# Patient Record
Sex: MALE | Race: WHITE | NOT HISPANIC OR LATINO | Employment: OTHER | URBAN - METROPOLITAN AREA
[De-identification: names, ages, dates, MRNs, and addresses within clinical notes are randomized per-mention and may not be internally consistent; named-entity substitution may affect disease eponyms.]

---

## 2023-01-03 LAB
CHOLEST SERPL-MSCNC: 227 MG/DL (ref 0–200)
HDLC SERPL-MCNC: 32 MG/DL (ref 35–70)
LDLC SERPL CALC-MCNC: 152 MG/DL (ref 0–160)
TRIGL SERPL-MCNC: 217 MG/DL (ref 40–160)

## 2023-07-10 DIAGNOSIS — G89.29 CHRONIC NECK PAIN: Primary | ICD-10-CM

## 2023-07-10 DIAGNOSIS — G89.29 CHRONIC GENERALIZED PAIN: ICD-10-CM

## 2023-07-10 DIAGNOSIS — M54.2 CHRONIC NECK PAIN: Primary | ICD-10-CM

## 2023-07-10 DIAGNOSIS — R52 CHRONIC GENERALIZED PAIN: ICD-10-CM

## 2023-07-10 RX ORDER — MELOXICAM 15 MG/1
15 TABLET ORAL DAILY
Qty: 30 TABLET | Refills: 1 | Status: SHIPPED | OUTPATIENT
Start: 2023-07-10 | End: 2023-09-14 | Stop reason: DRUGHIGH

## 2023-07-10 RX ORDER — MELOXICAM 15 MG/1
15 TABLET ORAL DAILY
COMMUNITY
End: 2023-07-10 | Stop reason: SDUPTHER

## 2023-07-10 NOTE — TELEPHONE ENCOUNTER
Patient requesting refill  Last Rx 05/09/2023    # 30   X 0    Last ov 05/09/2023    Next  07/24/2023   Lab

## 2023-07-27 DIAGNOSIS — F41.9 ANXIETY: Primary | ICD-10-CM

## 2023-07-27 RX ORDER — ALPRAZOLAM 0.5 MG/1
0.5 TABLET ORAL DAILY PRN
COMMUNITY
Start: 2023-07-07 | End: 2023-07-27 | Stop reason: SDUPTHER

## 2023-08-10 RX ORDER — ALPRAZOLAM 0.5 MG/1
0.5 TABLET ORAL DAILY PRN
Qty: 30 TABLET | Refills: 0 | Status: SHIPPED | OUTPATIENT
Start: 2023-08-10 | End: 2023-09-14 | Stop reason: SDUPTHER

## 2023-08-28 ENCOUNTER — TELEPHONE (OUTPATIENT)
Dept: ADMINISTRATIVE | Facility: HOSPITAL | Age: 48
End: 2023-08-28
Payer: MEDICARE

## 2023-08-28 RX ORDER — ACETAMINOPHEN 500 MG
5000 TABLET ORAL DAILY
COMMUNITY
End: 2023-09-14

## 2023-08-28 RX ORDER — CETIRIZINE HYDROCHLORIDE 10 MG/1
10 TABLET ORAL DAILY
COMMUNITY
End: 2024-03-21

## 2023-08-28 RX ORDER — FLUTICASONE PROPIONATE 50 MCG
1 SPRAY, SUSPENSION (ML) NASAL DAILY
COMMUNITY
End: 2024-03-21

## 2023-08-28 RX ORDER — ADALIMUMAB 40MG/0.4ML
40 KIT SUBCUTANEOUS WEEKLY
COMMUNITY

## 2023-09-08 PROBLEM — L73.2 HIDRADENITIS SUPPURATIVA: Status: ACTIVE | Noted: 2023-09-08

## 2023-09-08 PROBLEM — J30.9 NASAL SINUS INFLAMMATION DUE TO ALLERGY: Status: ACTIVE | Noted: 2023-09-08

## 2023-09-08 PROBLEM — E66.09 OBESITY DUE TO EXCESS CALORIES WITHOUT SERIOUS COMORBIDITY: Status: ACTIVE | Noted: 2023-09-08

## 2023-09-08 PROBLEM — F41.9 ANXIETY: Status: ACTIVE | Noted: 2023-09-08

## 2023-09-14 ENCOUNTER — OFFICE VISIT (OUTPATIENT)
Dept: FAMILY MEDICINE | Facility: CLINIC | Age: 48
End: 2023-09-14
Payer: MEDICARE

## 2023-09-14 VITALS
RESPIRATION RATE: 20 BRPM | SYSTOLIC BLOOD PRESSURE: 122 MMHG | DIASTOLIC BLOOD PRESSURE: 80 MMHG | TEMPERATURE: 97 F | OXYGEN SATURATION: 96 % | HEART RATE: 80 BPM | HEIGHT: 68 IN | WEIGHT: 312.63 LBS | BODY MASS INDEX: 47.38 KG/M2

## 2023-09-14 DIAGNOSIS — M25.50 PAIN IN JOINT, MULTIPLE SITES: ICD-10-CM

## 2023-09-14 DIAGNOSIS — M54.2 NECK PAIN: ICD-10-CM

## 2023-09-14 DIAGNOSIS — G47.33 OSA (OBSTRUCTIVE SLEEP APNEA): ICD-10-CM

## 2023-09-14 DIAGNOSIS — F41.1 GENERALIZED ANXIETY DISORDER: Primary | ICD-10-CM

## 2023-09-14 DIAGNOSIS — E55.9 VITAMIN D DEFICIENCY: ICD-10-CM

## 2023-09-14 DIAGNOSIS — Z79.899 ENCOUNTER FOR LONG-TERM (CURRENT) USE OF MEDICATIONS: ICD-10-CM

## 2023-09-14 DIAGNOSIS — E66.01 CLASS 3 SEVERE OBESITY DUE TO EXCESS CALORIES WITH SERIOUS COMORBIDITY AND BODY MASS INDEX (BMI) OF 45.0 TO 49.9 IN ADULT: ICD-10-CM

## 2023-09-14 DIAGNOSIS — J30.9 NASAL SINUS INFLAMMATION DUE TO ALLERGY: ICD-10-CM

## 2023-09-14 PROBLEM — E78.2 MIXED HYPERLIPIDEMIA: Status: RESOLVED | Noted: 2023-09-14 | Resolved: 2023-09-14

## 2023-09-14 PROBLEM — K21.9 GERD (GASTROESOPHAGEAL REFLUX DISEASE): Status: ACTIVE | Noted: 2023-09-14

## 2023-09-14 PROBLEM — E78.2 MIXED HYPERLIPIDEMIA: Status: ACTIVE | Noted: 2023-09-14

## 2023-09-14 PROBLEM — I10 HTN (HYPERTENSION): Status: ACTIVE | Noted: 2023-09-14

## 2023-09-14 PROBLEM — I10 HTN (HYPERTENSION): Status: RESOLVED | Noted: 2023-09-14 | Resolved: 2023-09-14

## 2023-09-14 PROBLEM — E66.9 OBESE: Status: ACTIVE | Noted: 2023-09-08

## 2023-09-14 PROCEDURE — 99214 PR OFFICE/OUTPT VISIT, EST, LEVL IV, 30-39 MIN: ICD-10-PCS | Mod: ,,, | Performed by: FAMILY MEDICINE

## 2023-09-14 PROCEDURE — 99214 OFFICE O/P EST MOD 30 MIN: CPT | Mod: ,,, | Performed by: FAMILY MEDICINE

## 2023-09-14 RX ORDER — ERGOCALCIFEROL 1.25 MG/1
50000 CAPSULE ORAL
Qty: 12 CAPSULE | Refills: 3 | Status: SHIPPED | OUTPATIENT
Start: 2023-09-14 | End: 2024-03-26 | Stop reason: SDUPTHER

## 2023-09-14 RX ORDER — MELOXICAM 15 MG/1
15 TABLET ORAL DAILY
Qty: 30 TABLET | Refills: 1 | Status: SHIPPED | OUTPATIENT
Start: 2023-09-14

## 2023-09-14 RX ORDER — ERGOCALCIFEROL 1.25 MG/1
50000 CAPSULE ORAL
COMMUNITY
End: 2023-09-14 | Stop reason: SDUPTHER

## 2023-09-14 RX ORDER — ALPRAZOLAM 0.5 MG/1
0.5 TABLET ORAL DAILY PRN
Qty: 30 TABLET | Refills: 2 | Status: SHIPPED | OUTPATIENT
Start: 2023-09-14 | End: 2023-12-18 | Stop reason: SDUPTHER

## 2023-09-14 NOTE — ASSESSMENT & PLAN NOTE
Vitamin D level 26.20 ng/ml on 01/04/2023.Refill on  Ergocalciferol 50,000 unit cap one every 7 days.

## 2023-09-14 NOTE — ASSESSMENT & PLAN NOTE
Patient's anxiety is well controlled with medication. Refill Alprazolam 0.5 mg one po q day prn anxiety X 3 mos.

## 2023-09-14 NOTE — PROGRESS NOTES
Patient Name: Markie Hawley     Patient ID: [id number]     Chief Complaint: Follow-up (Medication refills. Refill on Xanax 0.5 mg, Mobic 15 mg,Vit D  50,000 IU )      HPI:     Markie Hawley is a 48 y.o. male here today for med refills and follow-up of his chronic anxiety.  He would also like a refill on his meloxicam.  Patient says he has occasional pain in his neck and hands and fingers and the meloxicam works well for this.    Past Medical History:   Diagnosis Date    Anxiety disorder, unspecified     GERD (gastroesophageal reflux disease)     Hidradenitis suppurativa     Neck pain     Obese     SANDOR (obstructive sleep apnea)     Vitamin D deficiency         Past Surgical History:   Procedure Laterality Date    CYST REMOVAL Right 09/07/2023    cheek and jaw area    FEMUR SURGERY Right     KNEE ARTHROPLASTY Right     KNEE ARTHROSCOPY W/ PCL AND LCL  REPAIR & TENDON GRAFT Right         Social History     Socioeconomic History    Marital status: Single   Tobacco Use    Smoking status: Every Day     Current packs/day: 1.00     Types: Cigarettes    Smokeless tobacco: Former     Types: Chew     Quit date: 2000   Substance and Sexual Activity    Alcohol use: Never    Drug use: Never        Current Outpatient Medications   Medication Instructions    ALPRAZolam (XANAX) 0.5 mg, Oral, Daily PRN    cetirizine (ZYRTEC) 10 mg, Oral, Daily    ergocalciferol (VITAMIN D2) 50,000 Units, Oral, Every 7 days    fluticasone propionate (FLONASE) 50 mcg/actuation nasal spray 1 spray, Each Nostril, Daily    HUMIRA(CF) 40 mg, Subcutaneous, Weekly       Review of patient's allergies indicates:  No Known Allergies     Immunization History   Administered Date(s) Administered    COVID-19, MRNA, LN-S, PF (Pfizer) (Purple Cap) 02/05/2021, 02/26/2021       Patient Care Team:  Florentino Cordova Sr., MD as PCP - General (Family Medicine)  VANDA Lee MD as Consulting Physician (Dermatology)     Subjective:     Review of Systems    10  "point review of systems conducted, negative except as stated in the history of present illness. See HPI for details.    Objective:     Visit Vitals  /80 (BP Location: Left arm, Patient Position: Sitting, BP Method: Large (Manual))   Pulse 80   Temp 97.2 °F (36.2 °C)   Resp 20   Ht 5' 8" (1.727 m)   Wt (!) 141.8 kg (312 lb 9.6 oz)   SpO2 96%   BMI 47.53 kg/m²       Physical Exam  Constitutional:       Appearance: Normal appearance.   HENT:      Head: Normocephalic and atraumatic.   Cardiovascular:      Rate and Rhythm: Normal rate and regular rhythm.   Pulmonary:      Effort: Pulmonary effort is normal.      Breath sounds: Normal breath sounds.   Abdominal:      Palpations: Abdomen is soft.   Musculoskeletal:         General: Normal range of motion.      Cervical back: Normal range of motion.      Right lower leg: No edema.      Left lower leg: No edema.      Comments: Bilateral hands and fingers reveal no redness, swelling, tenderness or evidence of synovitis.   Neurological:      General: No focal deficit present.      Mental Status: He is alert and oriented to person, place, and time.   Psychiatric:         Mood and Affect: Mood normal.           Assessment:       ICD-10-CM ICD-9-CM   1. Generalized anxiety disorder  F41.1 300.02   2. Neck pain  M54.2 723.1   3. Pain in joint, multiple sites  M25.50 719.49   4. Class 3 severe obesity due to excess calories with serious comorbidity and body mass index (BMI) of 45.0 to 49.9 in adult  E66.01 278.01    Z68.42 V85.42   5. SANDOR (obstructive sleep apnea)  G47.33 327.23   6. Nasal sinus inflammation due to allergy  J30.9 477.9   7. Vitamin D deficiency  E55.9 268.9   8. Encounter for long-term (current) use of medications  Z79.899 V58.69        Plan:     1. Generalized anxiety disorder  Overview:  Continue present med tx.: Alprazolam 0.5 mg one po q day prn anxiety.  Practice deep breathing or abdominal breathing exercises when anxiety occurs.  Exercise daily. Get " sunlight daily.  Avoid caffeine, alcohol and stimulants.  Practice positive phrases and repeat throughout the day, along with yoga and relaxation techniques.  Set healthy boundaries, avoid people and conversations that increase stress.  Educated patient on the risks of serotonin based medications such as serotonin modulators and SSRIs/SNRIs including common side effects of nausea, GI upset, headache dizziness as well as the rare risk for worsening symptoms of depression including development of suicidal thoughts or ideations, and serotonin syndrome.   Discussed benefits of medication not becoming noticeable until up to 6 weeks from start date.   Reports any symptoms of suicidal or homicidal ideations immediately, if clinic is closed go to nearest emergency room.      Assessment & Plan:  Patient's anxiety is well controlled with medication. Refill Alprazolam 0.5 mg one po q day prn anxiety X 3 mos.       2. Neck pain  Comments:  Patient's neck pain is musculoskeletal without evidence of radiculopathy and occurs only occasionally.  We will continue with meloxicam PRN.    3. Pain in joint, multiple sites  Comments:  Patient is probably developing osteoarthritis in his hands and fingers.  We will refill his meloxicam.    4. Class 3 severe obesity due to excess calories with serious comorbidity and body mass index (BMI) of 45.0 to 49.9 in adult  Overview:  Goal BMI <30.  Exercise 5 times a week for 30 minutes per day.  Avoid soda, simple sugars, excessive rice, potatoes or bread. Limit fast foods and fried foods.  Choose complex carbs in moderation (example: green vegetables, beans, oatmeal). Eat plenty of fresh fruits and vegetables with lean meats daily.  Do not skip meals. Eat a balanced portion size.  Avoid fad diets. Consider permanent healthy life style changes.     Assessment & Plan:  BMI 47.53 kg/m2      5. SANDOR (obstructive sleep apnea)  Overview:  Wears CPAP/BiPAP and reports compliance nightly. Life time use  expected.  Has experienced improved daytime fatigue.  Avoid excessive alcohol, smoking and overuse of sedatives at bedtime.  Weight management discussed. Goal BMI <30.  Adjust sleep position PRN.      6. Nasal sinus inflammation due to allergy  Overview:  Continue present med tx.  Patient uses an over-the-counter steroid nasal spray with good results  Avoid/limit triggers such as pollen, dust, molds and animal dander.  Avoid smoke, strong chemicals, and strong cleaning products in addition to strong perfumes/colognes.  Use rescue inhaler when needed, use nebulizer for wheezing or URI.    Assessment & Plan:  Patient has sinus allergies are well controlled with a steroid nasal spray and over-the-counter antihistamine.      7. Vitamin D deficiency  Overview:  Pt. instructed to continue with vitamin d therapy as prescibed    Assessment & Plan:  Vitamin D level 26.20 ng/ml on 01/04/2023.Refill on  Ergocalciferol 50,000 unit cap one every 7 days.      8. Encounter for long-term (current) use of medications        [] Discussed lab findings with the patient.  [x] Discussed diet, exercise and if appropriate, weight loss.  [x] Instructions and information, including risks and benefits of prescribed medication(s) have been reviewed with the patient and patient verbalizes understanding. Questions have been answered to the patient's satisfaction.  [x] Appropriate counseling has been given regarding anxiety and depressive issues that were discussed today.  [] Any lab drawn today will be reviewed by physician at the time it is received and appropriate recommendations bill be made and discussed with patient.     Follow up in about 3 months (around 12/14/2023) for With Fasting Labs prior to visit, Follow Up.   In addition to their scheduled follow up, the patient has also been instructed to follow up on as needed basis.     Future Appointments   Date Time Provider Department Center   12/13/2023  1:40 PM [facility]   12/18/2023  1:00 PM Florentino Cordova Sr., MD [facility] Jb Bourgeois Sr, MD

## 2023-09-14 NOTE — ASSESSMENT & PLAN NOTE
Patient has sinus allergies are well controlled with a steroid nasal spray and over-the-counter antihistamine.

## 2023-12-04 PROCEDURE — 84443 ASSAY THYROID STIM HORMONE: CPT | Performed by: FAMILY MEDICINE

## 2023-12-04 PROCEDURE — 80053 COMPREHEN METABOLIC PANEL: CPT | Performed by: FAMILY MEDICINE

## 2023-12-04 PROCEDURE — 85025 COMPLETE CBC W/AUTO DIFF WBC: CPT | Performed by: FAMILY MEDICINE

## 2023-12-04 PROCEDURE — 80061 LIPID PANEL: CPT | Performed by: FAMILY MEDICINE

## 2023-12-04 PROCEDURE — 82306 VITAMIN D 25 HYDROXY: CPT | Performed by: FAMILY MEDICINE

## 2023-12-18 ENCOUNTER — OFFICE VISIT (OUTPATIENT)
Dept: FAMILY MEDICINE | Facility: CLINIC | Age: 48
End: 2023-12-18
Payer: MEDICARE

## 2023-12-18 VITALS
SYSTOLIC BLOOD PRESSURE: 122 MMHG | HEIGHT: 68 IN | WEIGHT: 293 LBS | OXYGEN SATURATION: 97 % | DIASTOLIC BLOOD PRESSURE: 72 MMHG | TEMPERATURE: 98 F | HEART RATE: 65 BPM | BODY MASS INDEX: 44.41 KG/M2 | RESPIRATION RATE: 20 BRPM

## 2023-12-18 DIAGNOSIS — E78.2 MIXED HYPERLIPIDEMIA: ICD-10-CM

## 2023-12-18 DIAGNOSIS — E55.9 VITAMIN D DEFICIENCY: ICD-10-CM

## 2023-12-18 DIAGNOSIS — F41.1 GENERALIZED ANXIETY DISORDER: Primary | ICD-10-CM

## 2023-12-18 DIAGNOSIS — F17.200 SMOKER: ICD-10-CM

## 2023-12-18 DIAGNOSIS — G47.33 OSA (OBSTRUCTIVE SLEEP APNEA): ICD-10-CM

## 2023-12-18 DIAGNOSIS — E66.01 CLASS 3 SEVERE OBESITY DUE TO EXCESS CALORIES WITH SERIOUS COMORBIDITY AND BODY MASS INDEX (BMI) OF 40.0 TO 44.9 IN ADULT: ICD-10-CM

## 2023-12-18 DIAGNOSIS — Z79.899 ENCOUNTER FOR LONG-TERM (CURRENT) USE OF MEDICATIONS: ICD-10-CM

## 2023-12-18 PROCEDURE — 99213 PR OFFICE/OUTPT VISIT, EST, LEVL III, 20-29 MIN: ICD-10-PCS | Mod: ,,, | Performed by: FAMILY MEDICINE

## 2023-12-18 PROCEDURE — 99213 OFFICE O/P EST LOW 20 MIN: CPT | Mod: ,,, | Performed by: FAMILY MEDICINE

## 2023-12-18 RX ORDER — ROSUVASTATIN CALCIUM 10 MG/1
10 TABLET, COATED ORAL DAILY
Qty: 90 TABLET | Refills: 1 | Status: SHIPPED | OUTPATIENT
Start: 2023-12-18 | End: 2024-12-17

## 2023-12-18 RX ORDER — ALPRAZOLAM 0.5 MG/1
0.5 TABLET ORAL DAILY PRN
Qty: 30 TABLET | Refills: 2 | Status: SHIPPED | OUTPATIENT
Start: 2023-12-18 | End: 2024-03-21 | Stop reason: SDUPTHER

## 2023-12-18 NOTE — PROGRESS NOTES
Patient Name: Markie Hawley     Patient ID: [id number]     Chief Complaint: Results (Here for lab results.) and Medication Refill (Patient request refills on Xanax 0.5 mg, Mobic 15 mg.)      HPI:     Markie Hawley is a 48 y.o. male here today for anxiety and sleep apnea.  Patient needs a prescription for his alprazolam.    Past Medical History:   Diagnosis Date    Anxiety disorder, unspecified     GERD (gastroesophageal reflux disease)     Hidradenitis suppurativa     Hyperlipidemia     Hypertension     Migraines     Neck pain     Obese     SANDOR (obstructive sleep apnea)     Smoker     Vitamin D deficiency         Past Surgical History:   Procedure Laterality Date    CYST REMOVAL Right 09/07/2023    cheek and jaw area    FEMUR SURGERY Right     KNEE ARTHROPLASTY Right     KNEE ARTHROSCOPY W/ PCL AND LCL  REPAIR & TENDON GRAFT Right         Social History     Socioeconomic History    Marital status: Single   Tobacco Use    Smoking status: Every Day     Current packs/day: 0.50     Types: Cigarettes    Smokeless tobacco: Former     Types: Chew     Quit date: 2000   Substance and Sexual Activity    Alcohol use: Never    Drug use: Never     Social Determinants of Health     Financial Resource Strain: Patient Declined (12/18/2023)    Overall Financial Resource Strain (CARDIA)     Difficulty of Paying Living Expenses: Patient declined   Food Insecurity: Patient Declined (12/18/2023)    Hunger Vital Sign     Worried About Running Out of Food in the Last Year: Patient declined     Ran Out of Food in the Last Year: Patient declined   Transportation Needs: Patient Declined (12/18/2023)    PRAPARE - Transportation     Lack of Transportation (Medical): Patient declined     Lack of Transportation (Non-Medical): Patient declined   Physical Activity: Inactive (12/18/2023)    Exercise Vital Sign     Days of Exercise per Week: 0 days     Minutes of Exercise per Session: 0 min   Stress: Patient Declined (12/18/2023)    [place]  [place] [employer] - Occupational Stress Questionnaire     Feeling of Stress : Patient declined   Social Connections: Patient Declined (12/18/2023)    Social Connection and Isolation Panel [NHANES]     Frequency of Communication with Friends and Family: Patient declined     Frequency of Social Gatherings with Friends and Family: Patient declined     Attends Muslim Services: Patient declined     Active Member of Clubs or Organizations: Patient declined     Attends Club or Organization Meetings: Patient declined     Marital Status: Patient declined   Housing Stability: Patient Declined (12/18/2023)    Housing Stability Vital Sign     Unable to Pay for Housing in the Last Year: Patient declined     Unstable Housing in the Last Year: Patient declined        Current Outpatient Medications   Medication Instructions    ALPRAZolam (XANAX) 0.5 mg, Oral, Daily PRN    cetirizine (ZYRTEC) 10 mg, Oral, Daily    ergocalciferol (VITAMIN D2) 50,000 Units, Oral, Every 7 days    fluticasone propionate (FLONASE) 50 mcg/actuation nasal spray 1 spray, Each Nostril, Daily    HUMIRA(CF) 40 mg, Subcutaneous, Weekly    meloxicam (MOBIC) 15 mg, Oral, Daily, Take prn for pain once daily.       Review of patient's allergies indicates:   Allergen Reactions    Dexilant [dexlansoprazole] Other (See Comments)     Joint and muscle pain with low grade fever        Immunization History   Administered Date(s) Administered    COVID-19, MRNA, LN-S, PF (Pfizer) (Purple Cap) 02/05/2021, 02/26/2021       Patient Care Team:  Florentino Cordova Sr., MD as PCP - General (Family Medicine)  VANDA Lee MD as Consulting Physician (Dermatology)     Subjective:     Review of Systems    10 point review of systems conducted, negative except as stated in the history of present illness. See HPI for details.    Objective:     Visit Vitals  /72 (BP Location: Left arm, Patient Position: Sitting, BP Method: Large (Manual))   Pulse 65   Temp  "97.5 °F (36.4 °C)   Resp 20   Ht 5' 8" (1.727 m)   Wt 132.9 kg (293 lb)   SpO2 97%   BMI 44.55 kg/m²       Physical Exam  Constitutional:       Appearance: Normal appearance. He is obese.   HENT:      Head: Normocephalic and atraumatic.   Cardiovascular:      Rate and Rhythm: Normal rate and regular rhythm.   Pulmonary:      Effort: Pulmonary effort is normal.      Breath sounds: Normal breath sounds.   Abdominal:      Palpations: Abdomen is soft.      Tenderness: There is no abdominal tenderness.   Musculoskeletal:         General: No swelling or tenderness. Normal range of motion.      Cervical back: Normal range of motion and neck supple.      Right lower leg: No edema.      Left lower leg: No edema.   Lymphadenopathy:      Cervical: No cervical adenopathy.   Skin:     General: Skin is warm and dry.   Neurological:      General: No focal deficit present.      Mental Status: He is alert and oriented to person, place, and time.   Psychiatric:         Mood and Affect: Mood normal.         Assessment:       ICD-10-CM ICD-9-CM   1. Generalized anxiety disorder  F41.1 300.02   2. Mixed hyperlipidemia  E78.2 272.2   3. Class 3 severe obesity due to excess calories with serious comorbidity and body mass index (BMI) of 40.0 to 44.9 in adult  E66.01 278.01    Z68.41 V85.41   4. SANDOR (obstructive sleep apnea)  G47.33 327.23   5. Vitamin D deficiency  E55.9 268.9   6. Smoker  F17.200 305.1   7. Encounter for long-term (current) use of medications  Z79.899 V58.69       Plan:   1. Generalized anxiety disorder  Overview:  Continue present med tx.: Alprazolam 0.5 mg one po q day prn anxiety.  Practice deep breathing or abdominal breathing exercises when anxiety occurs.  Exercise daily. Get sunlight daily.  Avoid caffeine, alcohol and stimulants.  Practice positive phrases and repeat throughout the day, along with yoga and relaxation techniques.  Set healthy boundaries, avoid people and conversations that increase " stress.  Educated patient on the risks of serotonin based medications such as serotonin modulators and SSRIs/SNRIs including common side effects of nausea, GI upset, headache dizziness as well as the rare risk for worsening symptoms of depression including development of suicidal thoughts or ideations.      Reports any symptoms of suicidal or homicidal ideations immediately, if clinic is closed go to nearest emergency room.      Assessment & Plan:  Pt. Reports Alprazolam works well to control symptoms of anxiety.       2. Mixed hyperlipidemia  Overview:  Stressed importance of dietary modifications. Follow a low cholesterol, low saturated fat diet with less that 200mg of cholesterol a day.  Avoid fried foods and high saturated fats (high saturated fats less than 7% of calories).  Add Flax Seed/Fish Oil supplements to diet. Increase dietary fiber.  Regular exercise can reduce LDL and raise HDL. Stressed importance of physical activity 5 times per week for 30 minutes per day.      Assessment & Plan:  Patient's most recent LDL was 115 on 12/04/2023.  Dietary discretion was discussed. We will start patient on Crestor 10 mg daily.  We will continue to monitor.      3. Class 3 severe obesity due to excess calories with serious comorbidity and body mass index (BMI) of 40.0 to 44.9 in adult  Overview:  Goal BMI <30.  Exercise 5 times a week for 30 minutes per day.  Avoid soda, simple sugars, excessive rice, potatoes or bread. Limit fast foods and fried foods.  Choose complex carbs in moderation (example: green vegetables, beans, oatmeal). Eat plenty of fresh fruits and vegetables with lean meats daily.  Do not skip meals. Eat a balanced portion size.  Avoid fad diets. Consider permanent healthy life style changes.       4. SANDOR (obstructive sleep apnea)  Overview:  Wears CPAP/BiPAP and reports compliance nightly. Life time use expected.  Has experienced improved daytime fatigue.  Avoid excessive alcohol, smoking and overuse  of sedatives at bedtime.  Weight management discussed. Goal BMI <30.  Adjust sleep position PRN.      5. Vitamin D deficiency  Overview:  Pt. instructed to continue with vitamin d therapy as prescibed    Assessment & Plan:  Vitamin D level 47.3 ng/ml at goal. Pt. To continue Vitamin D 50,000 IU once a week.      6. Smoker  Overview:  Patient was offered smoking cessation techniques such as nicotine gum or patches.  They were also offered a more regimented smoking cessation program.  They were advised to decrease the number of cigarettes by 1 every few days until they completely stopped.  It was strongly recommended that they set a quit date and stick to it.  And finally, important health reasons to stop smoking were given to the patient. Approximately 5 minutes was spent discussing smoking cessation.       7. Encounter for long-term (current) use of medications  Overview:  Patient was instructed to continue with the prescribed medications as no adverse or cost issues were found.   Refills were given if needed.  Compliance issues were discussed as far as medications that patient is currently taking.  Discussed the possibility of getting off some medications that were not absolutely necessary.           [x] Discussed lab findings with the patient.  [x] Discussed diet, exercise and if appropriate, weight loss.  [x] Instructions and information, including risks and benefits of prescribed medication(s) have been reviewed with the patient and patient verbalizes understanding. Questions have been answered to the patient's satisfaction.  [x] Appropriate counseling has been given regarding anxiety and depressive issues that were discussed today.  [] Any lab drawn today will be reviewed by physician at the time it is received and appropriate recommendations bill be made and discussed with patient.     Follow up in about 3 months (around 3/18/2024) for With Fasting Labs prior to visit.   In addition to their scheduled follow  up, the patient has also been instructed to follow up on as needed basis.     Future Appointments   Date Time Provider Department Center   3/19/2024  7:20 AM [facility]   3/21/2024  8:30 AM Florentino Cordova Sr., MD [facility] Jb Bourgeois Sr, MD

## 2023-12-18 NOTE — ASSESSMENT & PLAN NOTE
Patient's most recent LDL was 115 on 12/04/2023.  Dietary discretion was discussed. We will start patient on Crestor 10 mg daily.  We will continue to monitor.

## 2024-03-19 PROCEDURE — 80061 LIPID PANEL: CPT | Performed by: FAMILY MEDICINE

## 2024-03-19 PROCEDURE — 82306 VITAMIN D 25 HYDROXY: CPT | Performed by: FAMILY MEDICINE

## 2024-03-19 PROCEDURE — 80076 HEPATIC FUNCTION PANEL: CPT | Performed by: FAMILY MEDICINE

## 2024-03-19 PROCEDURE — 82550 ASSAY OF CK (CPK): CPT | Performed by: FAMILY MEDICINE

## 2024-03-19 PROCEDURE — 85025 COMPLETE CBC W/AUTO DIFF WBC: CPT | Performed by: FAMILY MEDICINE

## 2024-03-21 ENCOUNTER — OFFICE VISIT (OUTPATIENT)
Dept: FAMILY MEDICINE | Facility: CLINIC | Age: 49
End: 2024-03-21
Payer: MEDICARE

## 2024-03-21 VITALS
HEART RATE: 88 BPM | RESPIRATION RATE: 18 BRPM | DIASTOLIC BLOOD PRESSURE: 70 MMHG | OXYGEN SATURATION: 98 % | TEMPERATURE: 98 F | BODY MASS INDEX: 45.01 KG/M2 | HEIGHT: 68 IN | WEIGHT: 297 LBS | SYSTOLIC BLOOD PRESSURE: 120 MMHG

## 2024-03-21 DIAGNOSIS — F17.200 SMOKER: ICD-10-CM

## 2024-03-21 DIAGNOSIS — E55.9 VITAMIN D DEFICIENCY: ICD-10-CM

## 2024-03-21 DIAGNOSIS — Z79.899 ENCOUNTER FOR LONG-TERM (CURRENT) USE OF MEDICATIONS: ICD-10-CM

## 2024-03-21 DIAGNOSIS — Z12.5 SCREENING PSA (PROSTATE SPECIFIC ANTIGEN): ICD-10-CM

## 2024-03-21 DIAGNOSIS — Z13.6 ENCOUNTER FOR SCREENING FOR CARDIOVASCULAR DISORDERS: ICD-10-CM

## 2024-03-21 DIAGNOSIS — E78.2 MIXED HYPERLIPIDEMIA: Primary | ICD-10-CM

## 2024-03-21 DIAGNOSIS — F41.1 GENERALIZED ANXIETY DISORDER: ICD-10-CM

## 2024-03-21 LAB
BASOPHILS # BLD AUTO: 0.13 X10(3)/MCL
BASOPHILS NFR BLD AUTO: 1 %
DEPRECATED CALCIDIOL+CALCIFEROL SERPL-MC: 76.2 NG/ML (ref 30–80)
EOSINOPHIL # BLD AUTO: 0.2 X10(3)/MCL (ref 0–0.9)
EOSINOPHIL NFR BLD AUTO: 1.6 %
ERYTHROCYTE [DISTWIDTH] IN BLOOD BY AUTOMATED COUNT: 14.3 % (ref 11.5–17)
HCT VFR BLD AUTO: 49.7 % (ref 42–52)
HGB BLD-MCNC: 15.6 G/DL (ref 14–18)
IMM GRANULOCYTES # BLD AUTO: 0.2 X10(3)/MCL (ref 0–0.04)
IMM GRANULOCYTES NFR BLD AUTO: 1.6 %
LYMPHOCYTES # BLD AUTO: 4.88 X10(3)/MCL (ref 0.6–4.6)
LYMPHOCYTES NFR BLD AUTO: 38.9 %
MCH RBC QN AUTO: 27.9 PG (ref 27–31)
MCHC RBC AUTO-ENTMCNC: 31.4 G/DL (ref 33–36)
MCV RBC AUTO: 88.9 FL (ref 80–94)
MONOCYTES # BLD AUTO: 0.84 X10(3)/MCL (ref 0.1–1.3)
MONOCYTES NFR BLD AUTO: 6.7 %
NEUTROPHILS # BLD AUTO: 6.3 X10(3)/MCL (ref 2.1–9.2)
NEUTROPHILS NFR BLD AUTO: 50.2 %
NRBC BLD AUTO-RTO: 0 %
PLATELET # BLD AUTO: 334 X10(3)/MCL (ref 130–400)
PLATELETS.RETICULATED NFR BLD AUTO: 3.3 % (ref 0.9–11.2)
PMV BLD AUTO: 11.1 FL (ref 7.4–10.4)
RBC # BLD AUTO: 5.59 X10(6)/MCL (ref 4.7–6.1)
WBC # SPEC AUTO: 12.55 X10(3)/MCL (ref 4.5–11.5)

## 2024-03-21 PROCEDURE — 99214 OFFICE O/P EST MOD 30 MIN: CPT | Mod: ,,, | Performed by: FAMILY MEDICINE

## 2024-03-21 RX ORDER — RIFAMPIN 300 MG/1
300 CAPSULE ORAL 2 TIMES DAILY
COMMUNITY
Start: 2024-03-14

## 2024-03-21 RX ORDER — SULFAMETHOXAZOLE AND TRIMETHOPRIM 800; 160 MG/1; MG/1
1 TABLET ORAL 2 TIMES DAILY
COMMUNITY
Start: 2024-03-14

## 2024-03-21 RX ORDER — ALPRAZOLAM 0.5 MG/1
0.5 TABLET ORAL DAILY PRN
Qty: 30 TABLET | Refills: 2 | Status: SHIPPED | OUTPATIENT
Start: 2024-03-21

## 2024-03-21 NOTE — PROGRESS NOTES
Patient Name: Markie Hawley     Patient ID: [id number]     Chief Complaint: Results (Go over lab results.)      HPI:     Markie Hawley is a 49 y.o. male here today for follow-up of anxiety, hyperlipidemia and to go over lab results.     Past Medical History:   Diagnosis Date    Anxiety disorder, unspecified     GERD (gastroesophageal reflux disease)     Hidradenitis suppurativa     Hyperlipidemia     Hypertension     Migraines     Neck pain     Obese     SANDOR (obstructive sleep apnea)     Smoker     Vitamin D deficiency         Past Surgical History:   Procedure Laterality Date    CYST REMOVAL Right 09/07/2023    cheek and jaw area    FEMUR SURGERY Right     KNEE ARTHROPLASTY Right     KNEE ARTHROSCOPY W/ PCL AND LCL  REPAIR & TENDON GRAFT Right         Social History     Socioeconomic History    Marital status: Single   Tobacco Use    Smoking status: Every Day     Current packs/day: 0.50     Types: Cigarettes    Smokeless tobacco: Former     Types: Chew     Quit date: 2000   Substance and Sexual Activity    Alcohol use: Never    Drug use: Never    Sexual activity: Yes     Partners: Female     Social Determinants of Health     Financial Resource Strain: Low Risk  (3/21/2024)    Overall Financial Resource Strain (CARDIA)     Difficulty of Paying Living Expenses: Not hard at all   Food Insecurity: No Food Insecurity (3/21/2024)    Hunger Vital Sign     Worried About Running Out of Food in the Last Year: Never true     Ran Out of Food in the Last Year: Never true   Transportation Needs: No Transportation Needs (3/21/2024)    PRAPARE - Transportation     Lack of Transportation (Medical): No     Lack of Transportation (Non-Medical): No   Physical Activity: Insufficiently Active (3/21/2024)    Exercise Vital Sign     Days of Exercise per Week: 3 days     Minutes of Exercise per Session: 20 min   Stress: No Stress Concern Present (3/21/2024)    [employer] - Occupational Stress  Questionnaire     Feeling of Stress : Not at all   Social Connections: Socially Isolated (3/21/2024)    Social Connection and Isolation Panel [NHANES]     Frequency of Communication with Friends and Family: Twice a week     Frequency of Social Gatherings with Friends and Family: Twice a week     Attends [employer] Services: Never     Active Member of Clubs or Organizations: No     Attends Club or Organization Meetings: Never     Marital Status: Never    Housing Stability: Unknown (3/21/2024)    Housing Stability Vital Sign     Unable to Pay for Housing in the Last Year: No     Unstable Housing in the Last Year: No        Current Outpatient Medications   Medication Instructions    ALPRAZolam (XANAX) 0.5 mg, Oral, Daily PRN    cetirizine (ZYRTEC) 10 mg, Oral, Daily    ergocalciferol (VITAMIN D2) 50,000 Units, Oral, Every 7 days    fluticasone propionate (FLONASE) 50 mcg/actuation nasal spray 1 spray, Each Nostril, Daily    HUMIRA(CF) 40 mg, Subcutaneous, Weekly    meloxicam (MOBIC) 15 mg, Oral, Daily, Take prn for pain once daily.    rifAMpin (RIFADIN) 300 mg, Oral, 2 times daily, Patient taking medication for two weeks.    rosuvastatin (CRESTOR) 10 mg, Oral, Daily    sulfamethoxazole-trimethoprim 800-160mg (BACTRIM DS) 800-160 mg Tab 1 tablet, Oral, 2 times daily, Patient taking this medication twice a day for a month.       Review of patient's allergies indicates:   Allergen Reactions    Dexilant [dexlansoprazole] Other (See Comments)     Joint and muscle pain with low grade fever        Immunization History   Administered Date(s) Administered    COVID-19, MRNA, LN-S, PF (Pfizer) (Purple Cap) 02/05/2021, 02/26/2021, 09/09/2021       Patient Care Team:  Florentino Cordova Sr., MD as PCP - General (Family Medicine)  VANDA Lee MD as Consulting Physician (Dermatology)     Subjective:     Review of Systems    10 point review of systems conducted, negative except as stated in the history of present illness.  "See HPI for details.    Objective:     Visit Vitals  /70 (BP Location: Right arm, Patient Position: Sitting, BP Method: Large (Manual))   Pulse 88   Temp 97.8 °F (36.6 °C)   Resp 18   Ht 5' 8" (1.727 m)   Wt 134.7 kg (297 lb)   SpO2 98%   BMI 45.16 kg/m²       Physical Exam  Constitutional:       Appearance: He is obese.   HENT:      Head: Normocephalic and atraumatic.   Cardiovascular:      Rate and Rhythm: Normal rate and regular rhythm.   Pulmonary:      Effort: Pulmonary effort is normal.      Breath sounds: Normal breath sounds.   Abdominal:      Palpations: Abdomen is soft.      Tenderness: There is no abdominal tenderness.   Musculoskeletal:         General: No swelling or tenderness. Normal range of motion.      Cervical back: Normal range of motion and neck supple.      Right lower leg: No edema.      Left lower leg: No edema.   Lymphadenopathy:      Cervical: No cervical adenopathy.   Skin:     General: Skin is warm and dry.   Neurological:      General: No focal deficit present.      Mental Status: He is alert and oriented to person, place, and time.   Psychiatric:         Mood and Affect: Mood normal.         Assessment:       ICD-10-CM ICD-9-CM   1. Mixed hyperlipidemia  E78.2 272.2   2. Generalized anxiety disorder  F41.1 300.02   3. Encounter for screening for cardiovascular disorders  Z13.6 V81.2   4. Encounter for long-term (current) use of medications  Z79.899 V58.69   5. Vitamin D deficiency  E55.9 268.9   6. Smoker  F17.200 305.1   7. Screening PSA (prostate specific antigen)  Z12.5 V76.44       Plan:   1. Mixed hyperlipidemia  Overview:  Current Medication: Crestor 10 mg one po q day.   Stressed importance of dietary modifications. Follow a low cholesterol, low saturated fat diet with less that 200mg of cholesterol a day.  Avoid fried foods and high saturated fats (high saturated fats less than 7% of calories).  Add Flax Seed/Fish Oil supplements to diet. Increase dietary fiber.  Regular " exercise can reduce LDL and raise HDL. Stressed importance of physical activity 5 times per week for 30 minutes per day.      Assessment & Plan:  Lab Results   Component Value Date    .00 03/19/2024    TRIG 139 03/19/2024    TRIG 217 (A) 01/03/2023    HDL 35 03/19/2024    HDL 32 (A) 01/03/2023    TOTALCHOLEST 5 03/19/2024    LDLCALC 152 01/03/2023     Patient's LDL is elevated.  He admits to taking the Crestor only twice a week secondary to adverse effects such as body aches and pains.  Patient was instructed to stop the Crestor.  I am going to send him to Cardiology and see if they can put him on 1 of the PCSK9 inhibitors.  He could also use a cardiac workup since he is overweight, out of shape and has a strong family history of coronary disease.      2. Generalized anxiety disorder  Overview:  Continue present med tx.: Alprazolam 0.5 mg one po q day prn anxiety.  Practice deep breathing or abdominal breathing exercises when anxiety occurs.  Exercise daily. Get sunlight daily.  Avoid caffeine, alcohol and stimulants.  Practice positive phrases and repeat throughout the day, along with yoga and relaxation techniques.  Set healthy boundaries, avoid people and conversations that increase stress.  Educated patient on the risks of serotonin based medications such as serotonin modulators and SSRIs/SNRIs including common side effects of nausea, GI upset, headache dizziness as well as the rare risk for worsening symptoms of depression including development of suicidal thoughts or ideations.      Reports any symptoms of suicidal or homicidal ideations immediately, if clinic is closed go to nearest emergency room.      Assessment & Plan:  Patient's anxiety is well controlled on the above regimen.      3. Encounter for screening for cardiovascular disorders  Comments:  We will have patient go for a CT calcium score.  Overview:  We will have patient go for a CT calcium score.      4. Encounter for long-term (current)  use of medications  Overview:  Patient was instructed to continue with the prescribed medications as no adverse or cost issues were found. Compliance issues were discussed as far as medications that patient is currently taking.  Discussed the possibility of getting off some medications that were not absolutely necessary.     Assessment & Plan:  Patient continues to complain of adverse effects of the cholesterol medication so we are going to have him stop it.      5. Vitamin D deficiency  Overview:  Pt. instructed to continue with vitamin d therapy as prescibed    Assessment & Plan:  Vitamin D level pending.      6. Smoker  Overview:  Patient was offered smoking cessation techniques such as nicotine gum or patches.  They were also offered a more regimented smoking cessation program.  They were advised to decrease the number of cigarettes by 1 every few days until they completely stopped.  It was strongly recommended that they set a quit date and stick to it.  And finally, important health reasons to stop smoking were given to the patient. Approximately 5 minutes was spent discussing smoking cessation.       7. Screening PSA (prostate specific antigen)  Overview:  PSA monitored yearly.          [x] Discussed lab findings with the patient.  [x] Discussed diet, exercise and if appropriate, weight loss.  [x] Instructions and information, including risks and benefits of prescribed medication(s) have been reviewed with the patient and patient verbalizes understanding. Questions have been answered to the patient's satisfaction.  [] Appropriate counseling has been given regarding anxiety and depressive issues that were discussed today.  [] Any lab drawn today will be reviewed by physician at the time it is received and appropriate recommendations bill be made and discussed with patient.     Follow up in about 3 months (around 6/21/2024).   In addition to their scheduled follow up, the patient has also been instructed to  follow up on as needed basis.     No future appointments.     Florentino Cordova Sr, MD

## 2024-03-21 NOTE — ASSESSMENT & PLAN NOTE
Lab Results   Component Value Date    .00 03/19/2024    TRIG 139 03/19/2024    TRIG 217 (A) 01/03/2023    HDL 35 03/19/2024    HDL 32 (A) 01/03/2023    TOTALCHOLEST 5 03/19/2024    LDLCALC 152 01/03/2023     Patient's LDL is elevated.  He admits to taking the Crestor only twice a week secondary to adverse effects such as body aches and pains.  Patient was instructed to stop the Crestor.  I am going to send him to Cardiology and see if they can put him on 1 of the PCSK9 inhibitors.  He could also use a cardiac workup since he is overweight, out of shape and has a strong family history of coronary disease.

## 2024-03-21 NOTE — ASSESSMENT & PLAN NOTE
Patient continues to complain of adverse effects of the cholesterol medication so we are going to have him stop it.

## 2024-03-22 LAB — PSA SERPL-MCNC: 0.1 NG/ML (ref 0–4)

## 2024-03-26 ENCOUNTER — TELEPHONE (OUTPATIENT)
Dept: FAMILY MEDICINE | Facility: CLINIC | Age: 49
End: 2024-03-26
Payer: MEDICARE

## 2024-03-26 DIAGNOSIS — E55.9 VITAMIN D DEFICIENCY: ICD-10-CM

## 2024-03-26 RX ORDER — ERGOCALCIFEROL 1.25 MG/1
50000 CAPSULE ORAL
Qty: 12 CAPSULE | Refills: 1 | Status: SHIPPED | OUTPATIENT
Start: 2024-03-26

## 2024-03-26 NOTE — TELEPHONE ENCOUNTER
Called and spoke with Bassam and he was notified of his vit D level  results, Dr Cordova has new orders for taking his Vit D, Reduce Vit D 2 50,000iu to 1 po Q 10 days , will recheck his level at next lab.

## 2024-04-01 ENCOUNTER — HOSPITAL ENCOUNTER (OUTPATIENT)
Dept: RADIOLOGY | Facility: HOSPITAL | Age: 49
Discharge: HOME | End: 2024-04-01
Attending: FAMILY MEDICINE
Payer: MEDICARE

## 2024-04-01 DIAGNOSIS — Z13.6 ENCOUNTER FOR SCREENING FOR CARDIOVASCULAR DISORDERS: ICD-10-CM

## 2024-04-01 PROCEDURE — 75571 CT HRT W/O DYE W/CA TEST: CPT | Mod: TC

## 2024-04-04 ENCOUNTER — TELEPHONE (OUTPATIENT)
Dept: FAMILY MEDICINE | Facility: CLINIC | Age: 49
End: 2024-04-04
Payer: MEDICARE

## 2024-04-04 DIAGNOSIS — R93.1 ELEVATED CORONARY ARTERY CALCIUM SCORE: Primary | ICD-10-CM

## 2024-04-04 NOTE — TELEPHONE ENCOUNTER
----- Message from Florentino Cordova Sr., MD sent at 4/4/2024 12:28 PM CDT -----  Tell patient his calcium score is a little high.  I think he needs to see a cardiologist to rule out coronary disease.  We can find out the name of the cardiologist that his father sees and refer him there.  Diagnosis is elevated calcium score.  ----- Message -----  From: Paola Kasper MA  Sent: 4/3/2024   8:42 AM CDT  To: Florentino Cordova Sr., MD

## 2024-04-04 NOTE — TELEPHONE ENCOUNTER
I spoke with the patient in regards to his CT Calcium Scoring that was done and he verbalized understanding. Patient wants to see Dr. Dominguez the cardiologist and I will go ahead and send that referral over today.

## 2024-04-05 ENCOUNTER — TELEPHONE (OUTPATIENT)
Dept: FAMILY MEDICINE | Facility: CLINIC | Age: 49
End: 2024-04-05
Payer: MEDICARE

## 2024-04-05 NOTE — TELEPHONE ENCOUNTER
----- Message from Lida Medeiros sent at 4/5/2024 11:08 AM CDT -----  CARDIOLOGY SPECIALIST CALLED SAYING WE SENT A REFERRAL ON HIM WITH DX OF ELEVATED CALCIUM SCORE BUT THERE WAS NO REPORT SENT WITH REFERRAL.  THEY WANT THE REPORT

## 2024-05-03 ENCOUNTER — TELEPHONE (OUTPATIENT)
Dept: FAMILY MEDICINE | Facility: CLINIC | Age: 49
End: 2024-05-03
Payer: MEDICARE

## 2024-05-03 NOTE — TELEPHONE ENCOUNTER
----- Message from Britney Youngblood sent at 5/3/2024 10:40 AM CDT -----  Nick with Dr. Dominguez is requesting the Calcium Score on patient to complete the referral.  [phone]

## 2024-05-20 DIAGNOSIS — M25.50 PAIN IN JOINT, MULTIPLE SITES: ICD-10-CM

## 2024-05-21 RX ORDER — MELOXICAM 15 MG/1
15 TABLET ORAL DAILY
Qty: 30 TABLET | Refills: 1 | Status: SHIPPED | OUTPATIENT
Start: 2024-05-21

## 2024-06-23 NOTE — ASSESSMENT & PLAN NOTE
Patient's anxiety is well controlled on the above regimen.  Continue Alprazolam 0.5 mg one po q day prn anxiety.    Practice deep breathing or abdominal breathing exercises when anxiety occurs.  Exercise daily. Get sunlight daily.  Avoid caffeine, alcohol and stimulants.  Practice positive phrases and repeat throughout the day, along with yoga and relaxation techniques.  Set healthy boundaries, avoid people and conversations that increase stress.  Educated patient on the risks of serotonin based medications such as serotonin modulators and SSRIs/SNRIs including common side effects of nausea, GI upset, headache dizziness as well as the rare risk for worsening symptoms of depression including development of suicidal thoughts or ideations.      Reports any symptoms of suicidal or homicidal ideations immediately, if clinic is closed go to nearest emergency room.

## 2024-06-23 NOTE — PROGRESS NOTES
Family Medicine    Patient ID: [id number]     Chief Complaint: Medication Refill (xanax)      HPI:     Markie Hawley is a 49 y.o. male here today for a follow up on anxiety and refill of medications.    Past Medical History:   Diagnosis Date    Anxiety disorder, unspecified     GERD (gastroesophageal reflux disease)     Hidradenitis suppurativa     Hyperlipidemia     Hypertension     Migraines     Neck pain     Obese     SANDOR (obstructive sleep apnea)     Smoker     Vitamin D deficiency         Past Surgical History:   Procedure Laterality Date    CYST REMOVAL Right 09/07/2023    cheek and jaw area    EXCISION OF HIDRADENITIS  06/08/2024    FEMUR SURGERY Right     KNEE ARTHROPLASTY Right     KNEE ARTHROSCOPY W/ PCL AND LCL  REPAIR & TENDON GRAFT Right         Social History     Tobacco Use    Smoking status: Every Day     Current packs/day: 0.50     Types: Cigarettes    Smokeless tobacco: Former     Types: Chew     Quit date: 2000   Substance and Sexual Activity    Alcohol use: Never    Drug use: Never    Sexual activity: Yes     Partners: Female        Current Outpatient Medications   Medication Instructions    ALPRAZolam (XANAX) 0.5 mg, Oral, Daily PRN    cholecalciferol (vitamin D3) 50,000 Units, Oral, Every 10 days    gabapentin (NEURONTIN) 300 mg, Oral, 2 times daily PRN    HUMIRA(CF) 40 mg, Subcutaneous, Weekly    meloxicam (MOBIC) 15 mg, Oral, Daily, Take prn for pain once daily.    rifAMpin (RIFADIN) 300 mg, 2 times daily    rosuvastatin (CRESTOR) 10 mg, Oral, Daily    sulfamethoxazole-trimethoprim 800-160mg (BACTRIM DS) 800-160 mg Tab 1 tablet, 2 times daily       Review of patient's allergies indicates:   Allergen Reactions    Dexilant [dexlansoprazole] Other (See Comments)     Joint and muscle pain with low grade fever        Patient Care Team:  Florentino Cordova Sr., MD as PCP - General (Family Medicine)  VANDA Lee MD as Consulting Physician (Dermatology)  Estevan Dominguez MD as Consulting  "Physician (Cardiology)     Subjective:     Review of Systems   Constitutional:  Negative for activity change, appetite change, fever and unexpected weight change.   HENT:  Negative for congestion, dental problem, rhinorrhea and trouble swallowing.    Eyes:  Negative for visual disturbance.   Respiratory:  Negative for chest tightness and shortness of breath.    Cardiovascular:  Negative for chest pain, palpitations and leg swelling.   Gastrointestinal:  Negative for abdominal pain, blood in stool, constipation, diarrhea, nausea and vomiting.   Genitourinary:  Negative for difficulty urinating.   Musculoskeletal:  Negative for gait problem.   Skin:  Negative for rash and wound.   Neurological:  Negative for dizziness, syncope, speech difficulty, weakness and light-headedness.   Psychiatric/Behavioral:  Negative for confusion and suicidal ideas.        12 point review of systems conducted, negative except as stated in the history of present illness. See HPI for details.    Objective:     Visit Vitals  /76   Pulse 84   Temp 97.6 °F (36.4 °C)   Resp 18   Ht 5' 8" (1.727 m)   Wt 129.3 kg (285 lb)   SpO2 97%   BMI 43.33 kg/m²       Physical Exam  Vitals and nursing note reviewed.   Constitutional:       General: He is not in acute distress.     Appearance: Normal appearance. He is not ill-appearing, toxic-appearing or diaphoretic.   HENT:      Head: Normocephalic and atraumatic.      Right Ear: Tympanic membrane, ear canal and external ear normal. There is no impacted cerumen.      Left Ear: Tympanic membrane, ear canal and external ear normal. There is no impacted cerumen.      Nose: No congestion or rhinorrhea.      Mouth/Throat:      Pharynx: Oropharynx is clear. No oropharyngeal exudate or posterior oropharyngeal erythema.   Eyes:      General:         Right eye: No discharge.         Left eye: No discharge.      Extraocular Movements: Extraocular movements intact.      Conjunctiva/sclera: Conjunctivae normal. " "  Cardiovascular:      Rate and Rhythm: Normal rate and regular rhythm.      Heart sounds: No murmur heard.     No friction rub. No gallop.   Pulmonary:      Effort: Pulmonary effort is normal. No respiratory distress.      Breath sounds: Normal breath sounds.   Musculoskeletal:      Right lower leg: No edema.      Left lower leg: No edema.   Skin:     Capillary Refill: Capillary refill takes less than 2 seconds.   Neurological:      Mental Status: He is alert and oriented to person, place, and time.   Psychiatric:         Mood and Affect: Mood normal.         Behavior: Behavior normal.         Thought Content: Thought content normal.         Labs Reviewed:     Chemistry:  Lab Results   Component Value Date     (L) 12/04/2023    K 4.0 12/04/2023    BUN 12.5 12/04/2023    CREATININE 0.72 (L) 12/04/2023    EGFRNORACEVR >60 12/04/2023    GLUCOSE 90 12/04/2023    CALCIUM 9.1 12/04/2023    ALKPHOS 107 03/19/2024    LABPROT 8.3 03/19/2024    ALBUMIN 3.7 03/19/2024    BILIDIR 0.2 03/19/2024    IBILI 0.40 03/19/2024    AST 20 03/19/2024    ALT 25 03/19/2024    ZSYQAFNM07RG 76.2 03/19/2024    TSH 1.998 12/04/2023        No results found for: "HGBA1C", "MICROALBCREA"     Hematology:  Lab Results   Component Value Date    WBC 12.55 (H) 03/19/2024    HGB 15.6 03/19/2024    HCT 49.7 03/19/2024     03/19/2024       Lipid Panel:  Lab Results   Component Value Date    CHOL 192 03/19/2024    HDL 35 03/19/2024    .00 03/19/2024    TRIG 139 03/19/2024    TOTALCHOLEST 5 03/19/2024        Urine:  No results found for: "COLORUA", "APPEARANCEUA", "SGUA", "PHUA", "PROTEINUA", "GLUCOSEUA", "KETONESUA", "BLOODUA", "NITRITESUA", "LEUKOCYTESUR", "RBCUA", "WBCUA", "BACTERIA", "SQEPUA", "HYALINECASTS", "CREATRANDUR", "PROTEINURINE", "UPROTCREA"     Assessment:       ICD-10-CM ICD-9-CM   1. Generalized anxiety disorder  F41.1 300.02   2. Mixed hyperlipidemia  E78.2 272.2   3. Vitamin D deficiency  E55.9 268.9   4. Chronic " bilateral low back pain with bilateral sciatica  M54.42 724.2    M54.41 724.3    G89.29 338.29        Plan:     1. Generalized anxiety disorder  Overview:  Prescribe Xanax 0.5 mg daily p.r.n.  Has been on this dose for several years  No current side-effects  Patient reports anxiety is controlled on this medication    Assessment & Plan:  Patient's anxiety is well controlled on the above regimen.  Continue Alprazolam 0.5 mg one po q day prn anxiety.    Practice deep breathing or abdominal breathing exercises when anxiety occurs.  Exercise daily. Get sunlight daily.  Avoid caffeine, alcohol and stimulants.  Practice positive phrases and repeat throughout the day, along with yoga and relaxation techniques.  Set healthy boundaries, avoid people and conversations that increase stress.  Educated patient on the risks of serotonin based medications such as serotonin modulators and SSRIs/SNRIs including common side effects of nausea, GI upset, headache dizziness as well as the rare risk for worsening symptoms of depression including development of suicidal thoughts or ideations.      Reports any symptoms of suicidal or homicidal ideations immediately, if clinic is closed go to nearest emergency room.      Orders:  -     ALPRAZolam (XANAX) 0.5 MG tablet; Take 1 tablet (0.5 mg total) by mouth daily as needed for Anxiety.  Dispense: 30 tablet; Refill: 2    2. Mixed hyperlipidemia  Overview:  Current Medication: Crestor 10 mg one po q day.   Stressed importance of dietary modifications. Follow a low cholesterol, low saturated fat diet with less that 200mg of cholesterol a day.  Avoid fried foods and high saturated fats (high saturated fats less than 7% of calories).  Add Flax Seed/Fish Oil supplements to diet. Increase dietary fiber.  Regular exercise can reduce LDL and raise HDL. Stressed importance of physical activity 5 times per week for 30 minutes per day.      Assessment & Plan:  Lab Results   Component Value Date    LDL  129.00 03/19/2024    TRIG 139 03/19/2024    TRIG 217 (A) 01/03/2023    HDL 35 03/19/2024    HDL 32 (A) 01/03/2023    TOTALCHOLEST 5 03/19/2024    LDLCALC 152 01/03/2023     Currently seeing cardiology for possible PCSK9 inhibitors.  Had a recent calcium score done but his cardiologist does not have it yet and he has an appointment in a couple weeks.  He would like for us to send the calcium score to his cardiologist for that appointment      3. Vitamin D deficiency  Overview:  History of low vitamin-D  Started on vitamin D2, did not changes levels  Vitamin-D3 supplementation did increase his levels  Most recent prescription was D2 rather than D3  He would like that change back to D3    Assessment & Plan:  Refill D3  Repeat labs 3-6 months    Orders:  -     cholecalciferol, vitamin D3, 1,250 mcg (50,000 unit) Tab; Take 50,000 Units by mouth every 10 days.  Dispense: 12 tablet; Refill: 0    4. Chronic bilateral low back pain with bilateral sciatica  Overview:  Low back pain with bilateral sciatica intermittently for years  Has flares every few weeks  Tried gabapentin once in the morning and once in the evening in it worked better than relaxers previously given  We would like prescription for gabapentin  Declined back pain workup today    Assessment & Plan:  Prescribed gabapentin 300 mg twice daily p.r.n.  Discussed risks with concomitant gabapentin and Xanax and patient accepted risks and voiced understand    Orders:  -     gabapentin (NEURONTIN) 300 MG capsule; Take 1 capsule (300 mg total) by mouth 2 (two) times daily as needed (Sciatica).  Dispense: 60 capsule; Refill: 2     Declines health maintenance review, we will do during annual.    Follow up in about 3 months (around 9/28/2024). In addition to their scheduled follow up, the patient has also been instructed to follow up on as needed basis.     Future Appointments   Date Time Provider Department Center   9/26/2024  8:20 AM [facility]  [place]   10/1/2024  8:30 AM Florentino Cordova Sr., MD [facility] [place] MD

## 2024-06-24 PROBLEM — Z13.6 ENCOUNTER FOR SCREENING FOR CARDIOVASCULAR DISORDERS: Status: RESOLVED | Noted: 2024-03-21 | Resolved: 2024-06-24

## 2024-06-28 ENCOUNTER — OFFICE VISIT (OUTPATIENT)
Dept: FAMILY MEDICINE | Facility: CLINIC | Age: 49
End: 2024-06-28
Payer: MEDICARE

## 2024-06-28 VITALS
WEIGHT: 285 LBS | DIASTOLIC BLOOD PRESSURE: 76 MMHG | SYSTOLIC BLOOD PRESSURE: 132 MMHG | RESPIRATION RATE: 18 BRPM | OXYGEN SATURATION: 97 % | TEMPERATURE: 98 F | HEIGHT: 68 IN | HEART RATE: 84 BPM | BODY MASS INDEX: 43.19 KG/M2

## 2024-06-28 DIAGNOSIS — E78.2 MIXED HYPERLIPIDEMIA: ICD-10-CM

## 2024-06-28 DIAGNOSIS — E55.9 VITAMIN D DEFICIENCY: ICD-10-CM

## 2024-06-28 DIAGNOSIS — G89.29 CHRONIC BILATERAL LOW BACK PAIN WITH BILATERAL SCIATICA: ICD-10-CM

## 2024-06-28 DIAGNOSIS — M54.41 CHRONIC BILATERAL LOW BACK PAIN WITH BILATERAL SCIATICA: ICD-10-CM

## 2024-06-28 DIAGNOSIS — M54.42 CHRONIC BILATERAL LOW BACK PAIN WITH BILATERAL SCIATICA: ICD-10-CM

## 2024-06-28 DIAGNOSIS — E55.9 VITAMIN D DEFICIENCY: Primary | ICD-10-CM

## 2024-06-28 DIAGNOSIS — F41.1 GENERALIZED ANXIETY DISORDER: Primary | ICD-10-CM

## 2024-06-28 RX ORDER — CHOLECALCIFEROL (VITAMIN D3) 1250 MCG
50000 TABLET ORAL
COMMUNITY
End: 2024-06-28 | Stop reason: SDUPTHER

## 2024-06-28 RX ORDER — CHOLECALCIFEROL (VITAMIN D3) 1250 MCG
50000 TABLET ORAL
Qty: 12 TABLET | Refills: 0 | Status: SHIPPED | OUTPATIENT
Start: 2024-06-28

## 2024-06-28 RX ORDER — GABAPENTIN 300 MG/1
300 CAPSULE ORAL 2 TIMES DAILY PRN
Qty: 60 CAPSULE | Refills: 2 | Status: SHIPPED | OUTPATIENT
Start: 2024-06-28

## 2024-06-28 RX ORDER — ALPRAZOLAM 0.5 MG/1
0.5 TABLET ORAL DAILY PRN
Qty: 30 TABLET | Refills: 2 | Status: SHIPPED | OUTPATIENT
Start: 2024-06-28

## 2024-06-28 NOTE — ASSESSMENT & PLAN NOTE
Lab Results   Component Value Date    .00 03/19/2024    TRIG 139 03/19/2024    TRIG 217 (A) 01/03/2023    HDL 35 03/19/2024    HDL 32 (A) 01/03/2023    TOTALCHOLEST 5 03/19/2024    LDLCALC 152 01/03/2023     Currently seeing cardiology for possible PCSK9 inhibitors.  Had a recent calcium score done but his cardiologist does not have it yet and he has an appointment in a couple weeks.  He would like for us to send the calcium score to his cardiologist for that appointment

## 2024-06-28 NOTE — ASSESSMENT & PLAN NOTE
Prescribed gabapentin 300 mg twice daily p.r.n.  Discussed risks with concomitant gabapentin and Xanax and patient accepted risks and voiced understand

## 2024-09-15 DIAGNOSIS — M54.42 CHRONIC BILATERAL LOW BACK PAIN WITH BILATERAL SCIATICA: ICD-10-CM

## 2024-09-15 DIAGNOSIS — G89.29 CHRONIC BILATERAL LOW BACK PAIN WITH BILATERAL SCIATICA: ICD-10-CM

## 2024-09-15 DIAGNOSIS — M54.41 CHRONIC BILATERAL LOW BACK PAIN WITH BILATERAL SCIATICA: ICD-10-CM

## 2024-09-15 DIAGNOSIS — F41.1 GENERALIZED ANXIETY DISORDER: ICD-10-CM

## 2024-09-16 RX ORDER — GABAPENTIN 300 MG/1
CAPSULE ORAL
Qty: 60 CAPSULE | Refills: 2 | Status: SHIPPED | OUTPATIENT
Start: 2024-09-16

## 2024-09-16 RX ORDER — ALPRAZOLAM 0.5 MG/1
0.5 TABLET ORAL DAILY PRN
Qty: 30 TABLET | Refills: 2 | Status: SHIPPED | OUTPATIENT
Start: 2024-09-16

## 2024-09-26 PROCEDURE — 80053 COMPREHEN METABOLIC PANEL: CPT | Performed by: FAMILY MEDICINE

## 2024-09-26 PROCEDURE — 83036 HEMOGLOBIN GLYCOSYLATED A1C: CPT | Performed by: FAMILY MEDICINE

## 2024-09-26 PROCEDURE — 80061 LIPID PANEL: CPT | Performed by: FAMILY MEDICINE

## 2024-09-26 PROCEDURE — 85025 COMPLETE CBC W/AUTO DIFF WBC: CPT | Performed by: FAMILY MEDICINE

## 2024-09-26 PROCEDURE — 84443 ASSAY THYROID STIM HORMONE: CPT | Performed by: FAMILY MEDICINE

## 2024-09-26 PROCEDURE — 82306 VITAMIN D 25 HYDROXY: CPT | Performed by: FAMILY MEDICINE

## 2024-09-30 ENCOUNTER — OFFICE VISIT (OUTPATIENT)
Dept: FAMILY MEDICINE | Facility: CLINIC | Age: 49
End: 2024-09-30
Payer: MEDICARE

## 2024-09-30 VITALS
RESPIRATION RATE: 20 BRPM | HEART RATE: 80 BPM | BODY MASS INDEX: 44.22 KG/M2 | OXYGEN SATURATION: 96 % | TEMPERATURE: 98 F | DIASTOLIC BLOOD PRESSURE: 84 MMHG | WEIGHT: 291.81 LBS | SYSTOLIC BLOOD PRESSURE: 124 MMHG | HEIGHT: 68 IN

## 2024-09-30 DIAGNOSIS — E78.2 MIXED HYPERLIPIDEMIA: Primary | ICD-10-CM

## 2024-09-30 DIAGNOSIS — Z79.899 DRUG-INDUCED IMMUNODEFICIENCY: ICD-10-CM

## 2024-09-30 DIAGNOSIS — E55.9 VITAMIN D DEFICIENCY: ICD-10-CM

## 2024-09-30 DIAGNOSIS — L73.2 HIDRADENITIS SUPPURATIVA: ICD-10-CM

## 2024-09-30 DIAGNOSIS — E66.813 CLASS 3 SEVERE OBESITY DUE TO EXCESS CALORIES WITH SERIOUS COMORBIDITY AND BODY MASS INDEX (BMI) OF 40.0 TO 44.9 IN ADULT: ICD-10-CM

## 2024-09-30 DIAGNOSIS — F41.1 GENERALIZED ANXIETY DISORDER: ICD-10-CM

## 2024-09-30 DIAGNOSIS — E66.01 CLASS 3 SEVERE OBESITY DUE TO EXCESS CALORIES WITH SERIOUS COMORBIDITY AND BODY MASS INDEX (BMI) OF 40.0 TO 44.9 IN ADULT: ICD-10-CM

## 2024-09-30 DIAGNOSIS — D84.821 DRUG-INDUCED IMMUNODEFICIENCY: ICD-10-CM

## 2024-09-30 PROCEDURE — 99214 OFFICE O/P EST MOD 30 MIN: CPT | Mod: ,,, | Performed by: FAMILY MEDICINE

## 2024-09-30 RX ORDER — ASPIRIN 325 MG
50000 TABLET, DELAYED RELEASE (ENTERIC COATED) ORAL
Qty: 12 CAPSULE | Refills: 0 | Status: SHIPPED | OUTPATIENT
Start: 2024-09-30

## 2024-09-30 RX ORDER — EZETIMIBE 10 MG/1
10 TABLET ORAL DAILY
Qty: 90 TABLET | Refills: 3 | Status: SHIPPED | OUTPATIENT
Start: 2024-09-30 | End: 2025-09-30

## 2024-09-30 RX ORDER — ASPIRIN 325 MG
50000 TABLET, DELAYED RELEASE (ENTERIC COATED) ORAL
COMMUNITY
Start: 2024-06-28 | End: 2024-09-30 | Stop reason: SDUPTHER

## 2024-09-30 RX ORDER — ALPRAZOLAM 0.5 MG/1
0.5 TABLET ORAL DAILY PRN
Qty: 30 TABLET | Refills: 2 | Status: SHIPPED | OUTPATIENT
Start: 2024-09-30

## 2024-09-30 NOTE — PROGRESS NOTES
Family Medicine    Patient ID: [id number]     Chief Complaint: Medication Refill (Patient request refill on Vit D 3, and Xanax.) and Results (Patient here for lab results.)      HPI:     Markie Hawley is a 49 y.o. male here today for a follow up.     Past Medical History:   Diagnosis Date    Anxiety disorder, unspecified     GERD (gastroesophageal reflux disease)     Hidradenitis suppurativa     Hyperlipidemia     Hypertension     Migraines     Neck pain     Obese     SANDOR (obstructive sleep apnea)     Smoker     Vitamin D deficiency         Past Surgical History:   Procedure Laterality Date    CYST REMOVAL Right 09/07/2023    cheek and jaw area    EXCISION OF HIDRADENITIS  06/08/2024    FEMUR SURGERY Right     KNEE ARTHROPLASTY Right     KNEE ARTHROSCOPY W/ PCL AND LCL  REPAIR & TENDON GRAFT Right         Social History     Tobacco Use    Smoking status: Every Day     Current packs/day: 0.50     Types: Cigarettes    Smokeless tobacco: Former     Types: Chew     Quit date: 2000   Substance and Sexual Activity    Alcohol use: Never    Drug use: Never    Sexual activity: Yes     Partners: Female        Current Outpatient Medications   Medication Instructions    ALPRAZolam (XANAX) 0.5 mg, Oral, Daily PRN    cholecalciferol (vitamin D3) 50,000 Units, Every 10 days    gabapentin (NEURONTIN) 300 MG capsule TAKE ONE CAPSULE BY MOUTH TWICE DAILY AS NEEDED FOR SCIATICA    HUMIRA(CF) 40 mg, Weekly    meloxicam (MOBIC) 15 mg, Oral, Daily, Take prn for pain once daily.    rifAMpin (RIFADIN) 300 mg, 2 times daily    rosuvastatin (CRESTOR) 10 mg, Oral, Daily    sulfamethoxazole-trimethoprim 800-160mg (BACTRIM DS) 800-160 mg Tab 1 tablet, 2 times daily       Review of patient's allergies indicates:   Allergen Reactions    Dexilant [dexlansoprazole] Other (See Comments)     Joint and muscle pain with low grade fever        Patient Care Team:  Florentino Cordova Sr., MD as PCP - General (Family Medicine)  VANDA Lee MD as  "Consulting Physician (Dermatology)  Estevan Dominguez MD as Consulting Physician (Cardiology)     Subjective:     Review of Systems   Constitutional:  Negative for activity change, appetite change, fever and unexpected weight change.   HENT:  Negative for congestion, dental problem, rhinorrhea and trouble swallowing.    Eyes:  Negative for visual disturbance.   Respiratory:  Negative for chest tightness and shortness of breath.    Cardiovascular:  Negative for chest pain, palpitations and leg swelling.   Gastrointestinal:  Negative for abdominal pain, blood in stool, constipation, diarrhea, nausea and vomiting.   Genitourinary:  Negative for difficulty urinating.   Musculoskeletal:  Negative for gait problem.   Skin:  Negative for rash and wound.   Neurological:  Negative for dizziness, syncope, speech difficulty, weakness and light-headedness.   Psychiatric/Behavioral:  Negative for confusion and suicidal ideas.        12 point review of systems conducted, negative except as stated in the history of present illness. See HPI for details.    Objective:     Visit Vitals  /84 (Patient Position: Sitting)   Pulse 80   Temp 97.6 °F (36.4 °C)   Resp 20   Ht 5' 8" (1.727 m)   Wt 132.4 kg (291 lb 12.8 oz)   SpO2 96%   BMI 44.37 kg/m²       Physical Exam  Vitals and nursing note reviewed.   Constitutional:       General: He is not in acute distress.     Appearance: Normal appearance. He is obese. He is not ill-appearing, toxic-appearing or diaphoretic.   HENT:      Head: Normocephalic and atraumatic.      Right Ear: Tympanic membrane, ear canal and external ear normal. There is no impacted cerumen.      Left Ear: Tympanic membrane, ear canal and external ear normal. There is no impacted cerumen.      Nose: No congestion or rhinorrhea.      Mouth/Throat:      Pharynx: Oropharynx is clear. No oropharyngeal exudate or posterior oropharyngeal erythema.   Eyes:      General:         Right eye: No discharge.         Left eye: " "No discharge.      Extraocular Movements: Extraocular movements intact.      Conjunctiva/sclera: Conjunctivae normal.   Cardiovascular:      Rate and Rhythm: Normal rate and regular rhythm.      Heart sounds: No murmur heard.     No friction rub. No gallop.   Pulmonary:      Effort: Pulmonary effort is normal. No respiratory distress.      Breath sounds: Normal breath sounds.   Musculoskeletal:      Right lower leg: No edema.      Left lower leg: No edema.   Skin:     Capillary Refill: Capillary refill takes less than 2 seconds.   Neurological:      Mental Status: He is alert and oriented to person, place, and time.   Psychiatric:         Mood and Affect: Mood normal.         Behavior: Behavior normal.         Thought Content: Thought content normal.         Labs Reviewed:     Chemistry:  Lab Results   Component Value Date     (L) 09/26/2024    K 3.9 09/26/2024    BUN 8.7 (L) 09/26/2024    CREATININE 0.73 09/26/2024    EGFRNORACEVR >60 09/26/2024    GLUCOSE 102 (H) 09/26/2024    CALCIUM 8.6 09/26/2024    ALKPHOS 100 09/26/2024    LABPROT 7.5 09/26/2024    ALBUMIN 3.4 (L) 09/26/2024    BILIDIR 0.2 03/19/2024    IBILI 0.40 03/19/2024    AST 14 09/26/2024    ALT 18 09/26/2024    [id number] 65 09/26/2024    TSH 1.237 09/26/2024        Lab Results   Component Value Date    HGBA1C 5.2 09/26/2024        Hematology:  Lab Results   Component Value Date    WBC 10.24 09/26/2024    HGB 15.4 09/26/2024    HCT 47.4 09/26/2024     09/26/2024       Lipid Panel:  Lab Results   Component Value Date    CHOL 181 09/26/2024    HDL 36 09/26/2024    .00 09/26/2024    TRIG 133 09/26/2024    TOTALCHOLEST 5 09/26/2024        Urine:  No results found for: "COLORUA", "APPEARANCEUA", "SGUA", "PHUA", "PROTEINUA", "GLUCOSEUA", "KETONESUA", "BLOODUA", "NITRITESUA", "LEUKOCYTESUR", "RBCUA", "WBCUA", "BACTERIA", "SQEPUA", "HYALINECASTS", "CREATRANDUR", "PROTEINURINE", "UPROTCREA"     Assessment:       ICD-10-CM ICD-9-CM   1. " Mixed hyperlipidemia  E78.2 272.2   2. Vitamin D deficiency  E55.9 268.9   3. Generalized anxiety disorder  F41.1 300.02   4. Drug-induced immunodeficiency  D84.821 279.3    Z79.899 E947.9   5. Class 3 severe obesity due to excess calories with serious comorbidity and body mass index (BMI) of 40.0 to 44.9 in adult  E66.01 278.01    Z68.41 V85.41        Plan:     1. Mixed hyperlipidemia  Overview:  No current pharmacology  Current smoker  Statin intolerance, PCSK9 inhibitors not financially feasible  Elevated calcium score  Follows Cardiology      Assessment & Plan:  Lab Results   Component Value Date    .00 09/26/2024    TRIG 133 09/26/2024    TRIG 217 (A) 01/03/2023    HDL 36 09/26/2024    HDL 32 (A) 01/03/2023    TOTALCHOLEST 5 09/26/2024    LDLCALC 152 01/03/2023     Start Zetia 10 mg daily  Repeat A1c three-month      2. Vitamin D deficiency  Overview:  Prescription D3 31851 IU once every 10 days    Assessment & Plan:  At goal   Continue above medication at same dose      3. Generalized anxiety disorder  Overview:  Prescribe Xanax 0.5 mg daily p.r.n.  Has been on this dose for several years  No current side-effects  Patient reports anxiety is controlled on this medication      4. Drug-induced immunodeficiency  Overview:  Takes Humira forHidradenitis suppurativa once weekly, prescribed by Dermatology    Assessment & Plan:  No current break outs  Also has home Bactrim what she has not had to use  Follow up as needed      5. Class 3 severe obesity due to excess calories with serious comorbidity and body mass index (BMI) of 40.0 to 44.9 in adult  Overview:  Goal BMI <30.  Exercise 5 times a week for 30 minutes per day.  Avoid soda, simple sugars, excessive rice, potatoes or bread. Limit fast foods and fried foods.  Choose complex carbs in moderation (example: green vegetables, beans, oatmeal). Eat plenty of fresh fruits and vegetables with lean meats daily.  Do not skip meals. Eat a balanced portion  size.  Avoid fad diets. Consider permanent healthy life style changes.         Declines all vaccines     No follow-ups on file. In addition to their scheduled follow up, the patient has also been instructed to follow up on as needed basis.     Future Appointments   Date Time Provider Department Center   12/9/2024  9:20 AM LAB, [facility] Beyer   12/11/2024 11:00 AM PROVIDER, [facility] Byrd MD

## 2024-09-30 NOTE — ASSESSMENT & PLAN NOTE
Lab Results   Component Value Date    .00 09/26/2024    TRIG 133 09/26/2024    TRIG 217 (A) 01/03/2023    HDL 36 09/26/2024    HDL 32 (A) 01/03/2023    TOTALCHOLEST 5 09/26/2024    LDLCALC 152 01/03/2023     Start Zetia 10 mg daily  Repeat A1c three-month

## 2024-12-09 PROCEDURE — 82172 ASSAY OF APOLIPOPROTEIN: CPT | Performed by: FAMILY MEDICINE

## 2024-12-09 PROCEDURE — 80061 LIPID PANEL: CPT | Performed by: FAMILY MEDICINE

## 2024-12-09 PROCEDURE — 80053 COMPREHEN METABOLIC PANEL: CPT | Performed by: FAMILY MEDICINE

## 2024-12-11 ENCOUNTER — OFFICE VISIT (OUTPATIENT)
Dept: FAMILY MEDICINE | Facility: CLINIC | Age: 49
End: 2024-12-11
Payer: MEDICARE

## 2024-12-11 VITALS
TEMPERATURE: 99 F | SYSTOLIC BLOOD PRESSURE: 125 MMHG | HEIGHT: 68 IN | BODY MASS INDEX: 43.5 KG/M2 | OXYGEN SATURATION: 96 % | WEIGHT: 287 LBS | DIASTOLIC BLOOD PRESSURE: 82 MMHG | HEART RATE: 75 BPM | RESPIRATION RATE: 20 BRPM

## 2024-12-11 DIAGNOSIS — G89.29 CHRONIC BILATERAL LOW BACK PAIN WITH BILATERAL SCIATICA: ICD-10-CM

## 2024-12-11 DIAGNOSIS — E66.01 CLASS 3 SEVERE OBESITY DUE TO EXCESS CALORIES WITH SERIOUS COMORBIDITY AND BODY MASS INDEX (BMI) OF 40.0 TO 44.9 IN ADULT: ICD-10-CM

## 2024-12-11 DIAGNOSIS — M54.42 CHRONIC BILATERAL LOW BACK PAIN WITH BILATERAL SCIATICA: ICD-10-CM

## 2024-12-11 DIAGNOSIS — E55.9 VITAMIN D DEFICIENCY: ICD-10-CM

## 2024-12-11 DIAGNOSIS — R79.9 ABNORMAL SERUM TOTAL PROTEIN LEVEL: ICD-10-CM

## 2024-12-11 DIAGNOSIS — E66.813 CLASS 3 SEVERE OBESITY DUE TO EXCESS CALORIES WITH SERIOUS COMORBIDITY AND BODY MASS INDEX (BMI) OF 40.0 TO 44.9 IN ADULT: ICD-10-CM

## 2024-12-11 DIAGNOSIS — Z79.899 DRUG-INDUCED IMMUNODEFICIENCY: ICD-10-CM

## 2024-12-11 DIAGNOSIS — D84.821 DRUG-INDUCED IMMUNODEFICIENCY: ICD-10-CM

## 2024-12-11 DIAGNOSIS — E78.2 MIXED HYPERLIPIDEMIA: Primary | ICD-10-CM

## 2024-12-11 DIAGNOSIS — F41.1 GENERALIZED ANXIETY DISORDER: ICD-10-CM

## 2024-12-11 DIAGNOSIS — F17.200 SMOKER: ICD-10-CM

## 2024-12-11 DIAGNOSIS — M54.41 CHRONIC BILATERAL LOW BACK PAIN WITH BILATERAL SCIATICA: ICD-10-CM

## 2024-12-11 PROBLEM — Z12.5 SCREENING PSA (PROSTATE SPECIFIC ANTIGEN): Status: RESOLVED | Noted: 2024-03-21 | Resolved: 2024-12-11

## 2024-12-11 PROCEDURE — 99214 OFFICE O/P EST MOD 30 MIN: CPT | Mod: ,,, | Performed by: FAMILY MEDICINE

## 2024-12-11 RX ORDER — GABAPENTIN 300 MG/1
300 CAPSULE ORAL 2 TIMES DAILY
Qty: 60 CAPSULE | Refills: 2 | Status: CANCELLED | OUTPATIENT
Start: 2024-12-11

## 2024-12-11 RX ORDER — ASPIRIN 325 MG
50000 TABLET, DELAYED RELEASE (ENTERIC COATED) ORAL
Qty: 12 CAPSULE | Refills: 0 | Status: CANCELLED | OUTPATIENT
Start: 2024-12-11

## 2024-12-11 RX ORDER — GABAPENTIN 300 MG/1
300 CAPSULE ORAL 2 TIMES DAILY
Qty: 60 CAPSULE | Refills: 2 | Status: SHIPPED | OUTPATIENT
Start: 2024-12-11

## 2024-12-11 RX ORDER — ALPRAZOLAM 0.5 MG/1
0.5 TABLET ORAL DAILY PRN
Qty: 30 TABLET | Refills: 2 | Status: SHIPPED | OUTPATIENT
Start: 2024-12-11

## 2024-12-11 RX ORDER — ASPIRIN 325 MG
50000 TABLET, DELAYED RELEASE (ENTERIC COATED) ORAL
Qty: 12 CAPSULE | Refills: 0 | Status: SHIPPED | OUTPATIENT
Start: 2024-12-11

## 2024-12-11 RX ORDER — ALPRAZOLAM 0.5 MG/1
0.5 TABLET ORAL DAILY PRN
Qty: 30 TABLET | Refills: 2 | Status: CANCELLED | OUTPATIENT
Start: 2024-12-11

## 2024-12-11 NOTE — PROGRESS NOTES
Family Medicine    Patient ID: [id number]     Chief Complaint: Follow-up (Results follow up and refill )      HPI:     Markie Hawley is a 49 y.o. male here today for labs.    Past Medical History:   Diagnosis Date    Anxiety disorder, unspecified     GERD (gastroesophageal reflux disease)     Hidradenitis suppurativa     Hyperlipidemia     Hypertension     Migraines     Neck pain     Obese     SANDOR (obstructive sleep apnea)     Smoker     Vitamin D deficiency         Past Surgical History:   Procedure Laterality Date    CYST REMOVAL Right 09/07/2023    cheek and jaw area    EXCISION OF HIDRADENITIS  06/08/2024    FEMUR SURGERY Right     KNEE ARTHROPLASTY Right     KNEE ARTHROSCOPY W/ PCL AND LCL  REPAIR & TENDON GRAFT Right         Social History     Tobacco Use    Smoking status: Every Day     Current packs/day: 0.50     Types: Cigarettes    Smokeless tobacco: Former     Types: Chew     Quit date: 2000   Substance and Sexual Activity    Alcohol use: Never    Drug use: Never    Sexual activity: Yes     Partners: Female        Current Outpatient Medications   Medication Instructions    ALPRAZolam (XANAX) 0.5 mg, Oral, Daily PRN    cholecalciferol (vitamin D3) 50,000 Units, Oral, Every 10 days    ezetimibe (ZETIA) 10 mg, Oral, Daily    gabapentin (NEURONTIN) 300 mg, Oral, 2 times daily    HUMIRA(CF) 40 mg, Weekly    meloxicam (MOBIC) 15 mg, Oral, Daily, Take prn for pain once daily.    rifAMpin (RIFADIN) 300 mg, 2 times daily    sulfamethoxazole-trimethoprim 800-160mg (BACTRIM DS) 800-160 mg Tab 1 tablet, 2 times daily       Review of patient's allergies indicates:   Allergen Reactions    Dexilant [dexlansoprazole] Other (See Comments)     Joint and muscle pain with low grade fever    Statins-hmg-coa reductase inhibitors Diarrhea and Nausea And Vomiting        Patient Care Team:  Florentino Cordova Sr., MD as PCP - General (Family Medicine)  VANDA Lee MD as Consulting Physician (Dermatology)  Estevan Dominguez  "MD JINA as Consulting Physician (Cardiology)     Subjective:     Review of Systems   Constitutional:  Negative for activity change, appetite change, fever and unexpected weight change.   HENT:  Negative for congestion, dental problem, rhinorrhea and trouble swallowing.    Eyes:  Negative for visual disturbance.   Respiratory:  Negative for chest tightness and shortness of breath.    Cardiovascular:  Negative for chest pain, palpitations and leg swelling.   Gastrointestinal:  Negative for abdominal pain, blood in stool, constipation, diarrhea, nausea and vomiting.   Genitourinary:  Negative for difficulty urinating.   Musculoskeletal:  Negative for gait problem.   Skin:  Negative for rash and wound.   Neurological:  Negative for dizziness, syncope, speech difficulty, weakness and light-headedness.   Psychiatric/Behavioral:  Negative for confusion and suicidal ideas.        12 point review of systems conducted, negative except as stated in the history of present illness. See HPI for details.    Objective:     Visit Vitals  /82 (BP Location: Left arm, Patient Position: Sitting)   Pulse 75   Temp 98.5 °F (36.9 °C) (Skin)   Resp 20   Ht 5' 8" (1.727 m)   Wt 130.2 kg (287 lb)   SpO2 96%   BMI 43.64 kg/m²       Physical Exam  Vitals and nursing note reviewed.   Constitutional:       General: He is not in acute distress.     Appearance: Normal appearance. He is not ill-appearing, toxic-appearing or diaphoretic.   HENT:      Head: Normocephalic and atraumatic.      Right Ear: Tympanic membrane, ear canal and external ear normal. There is no impacted cerumen.      Left Ear: Tympanic membrane, ear canal and external ear normal. There is no impacted cerumen.      Nose: No congestion or rhinorrhea.      Mouth/Throat:      Pharynx: Oropharynx is clear. No oropharyngeal exudate or posterior oropharyngeal erythema.   Eyes:      General:         Right eye: No discharge.         Left eye: No discharge.      Extraocular " "Movements: Extraocular movements intact.      Conjunctiva/sclera: Conjunctivae normal.   Cardiovascular:      Rate and Rhythm: Normal rate and regular rhythm.      Heart sounds: No murmur heard.     No friction rub. No gallop.   Pulmonary:      Effort: Pulmonary effort is normal. No respiratory distress.      Breath sounds: Normal breath sounds.   Musculoskeletal:      Right lower leg: No edema.      Left lower leg: No edema.   Skin:     Capillary Refill: Capillary refill takes less than 2 seconds.   Neurological:      Mental Status: He is alert and oriented to person, place, and time.   Psychiatric:         Mood and Affect: Mood normal.         Behavior: Behavior normal.         Thought Content: Thought content normal.         Labs Reviewed:     Chemistry:  Lab Results   Component Value Date     (L) 12/09/2024    K 4.2 12/09/2024    BUN 10.7 12/09/2024    CREATININE 0.60 (L) 12/09/2024    EGFRNORACEVR >60 12/09/2024    GLUCOSE 91 12/09/2024    CALCIUM 7.9 (L) 12/09/2024    ALKPHOS 96 12/09/2024    LABPROT 7.4 12/09/2024    ALBUMIN 3.3 (L) 12/09/2024    BILIDIR 0.2 03/19/2024    IBILI 0.40 03/19/2024    AST 16 12/09/2024    ALT 14 12/09/2024    [id number] 65 09/26/2024    TSH 1.237 09/26/2024        Lab Results   Component Value Date    HGBA1C 5.2 09/26/2024        Hematology:  Lab Results   Component Value Date    WBC 10.24 09/26/2024    HGB 15.4 09/26/2024    HCT 47.4 09/26/2024     09/26/2024       Lipid Panel:  Lab Results   Component Value Date    CHOL 179 12/09/2024    HDL 33 (L) 12/09/2024    .00 12/09/2024    TRIG 158 (H) 12/09/2024    TOTALCHOLEST 5 12/09/2024        Urine:  No results found for: "COLORUA", "APPEARANCEUA", "SGUA", "PHUA", "PROTEINUA", "GLUCOSEUA", "KETONESUA", "BLOODUA", "NITRITESUA", "LEUKOCYTESUR", "RBCUA", "WBCUA", "BACTERIA", "SQEPUA", "HYALINECASTS", "CREATRANDUR", "PROTEINURINE", "UPROTCREA"     Assessment:       ICD-10-CM ICD-9-CM   1. Mixed hyperlipidemia  " E78.2 272.2   2. Generalized anxiety disorder  F41.1 300.02   3. Vitamin D deficiency  E55.9 268.9   4. Chronic bilateral low back pain with bilateral sciatica  M54.42 724.2    M54.41 724.3    G89.29 338.29   5. Class 3 severe obesity due to excess calories with serious comorbidity and body mass index (BMI) of 40.0 to 44.9 in adult  E66.813 278.01    E66.01 V85.41    Z68.41    6. Drug-induced immunodeficiency  D84.821 279.3    Z79.899 E947.9   7. Smoker  F17.200 305.1   8. Abnormal serum total protein level  R79.9 790.99        Plan:     1. Mixed hyperlipidemia  Overview:  Zetia 10 mg    Statin intolerant on Crestor  Risk factors: Smoker, BMI greater than 40  Follows Cardiology      Assessment & Plan:  Stop Zetia on his own due to GI side-effects, we will follow up with Cardiology  LDL-C 116   Non HDLC 146  Continue following with cardiology  ApoB not back yet      Orders:  -     Immunofixation (LIAN)& Protein Electrophoresis, Urine, 24Hr; Future; Expected date: 03/11/2025  -     Immunofixation & Protein Electrophoresis & Immunoglobulins; Future; Expected date: 03/11/2025  -     CBC Auto Differential; Future; Expected date: 03/11/2025  -     Comprehensive Metabolic Panel; Future; Expected date: 03/11/2025  -     Lipid Panel; Future; Expected date: 03/11/2025  -     TSH; Future; Expected date: 03/11/2025  -     Hemoglobin A1C; Future; Expected date: 03/11/2025  -     Urinalysis; Future; Expected date: 03/11/2025  -     Vitamin D; Future; Expected date: 03/11/2025    2. Generalized anxiety disorder  Overview:  Prescribe Xanax 0.5 mg daily p.r.n.  Has been on this dose for several years  No current side-effects  Patient reports anxiety is controlled on this medication    Orders:  -     ALPRAZolam (XANAX) 0.5 MG tablet; Take 1 tablet (0.5 mg total) by mouth daily as needed for Anxiety.  Dispense: 30 tablet; Refill: 2  -     Immunofixation (LIAN)& Protein Electrophoresis, Urine, 24Hr; Future; Expected date: 03/11/2025  -      Immunofixation & Protein Electrophoresis & Immunoglobulins; Future; Expected date: 03/11/2025  -     CBC Auto Differential; Future; Expected date: 03/11/2025  -     Comprehensive Metabolic Panel; Future; Expected date: 03/11/2025  -     Lipid Panel; Future; Expected date: 03/11/2025  -     TSH; Future; Expected date: 03/11/2025  -     Hemoglobin A1C; Future; Expected date: 03/11/2025  -     Urinalysis; Future; Expected date: 03/11/2025  -     Vitamin D; Future; Expected date: 03/11/2025    3. Vitamin D deficiency  Overview:  Prescription D3 98602 IU once every 10 days    Orders:  -     cholecalciferol, vitamin D3, 1,250 mcg (50,000 unit) capsule; Take 1 capsule (50,000 Units total) by mouth every 10 days.  Dispense: 12 capsule; Refill: 0  -     Immunofixation (LIAN)& Protein Electrophoresis, Urine, 24Hr; Future; Expected date: 03/11/2025  -     Immunofixation & Protein Electrophoresis & Immunoglobulins; Future; Expected date: 03/11/2025  -     CBC Auto Differential; Future; Expected date: 03/11/2025  -     Comprehensive Metabolic Panel; Future; Expected date: 03/11/2025  -     Lipid Panel; Future; Expected date: 03/11/2025  -     TSH; Future; Expected date: 03/11/2025  -     Hemoglobin A1C; Future; Expected date: 03/11/2025  -     Urinalysis; Future; Expected date: 03/11/2025  -     Vitamin D; Future; Expected date: 03/11/2025    4. Chronic bilateral low back pain with bilateral sciatica  Overview:  Low back pain with bilateral sciatica intermittently for years  Has flares every few weeks  Tried gabapentin once in the morning and once in the evening in it worked better than relaxers previously given  We would like prescription for gabapentin  Declined back pain workup today    Orders:  -     gabapentin (NEURONTIN) 300 MG capsule; Take 1 capsule (300 mg total) by mouth 2 (two) times daily.  Dispense: 60 capsule; Refill: 2  -     Immunofixation (LIAN)& Protein Electrophoresis, Urine, 24Hr; Future; Expected date:  03/11/2025  -     Immunofixation & Protein Electrophoresis & Immunoglobulins; Future; Expected date: 03/11/2025  -     CBC Auto Differential; Future; Expected date: 03/11/2025  -     Comprehensive Metabolic Panel; Future; Expected date: 03/11/2025  -     Lipid Panel; Future; Expected date: 03/11/2025  -     TSH; Future; Expected date: 03/11/2025  -     Hemoglobin A1C; Future; Expected date: 03/11/2025  -     Urinalysis; Future; Expected date: 03/11/2025  -     Vitamin D; Future; Expected date: 03/11/2025    5. Class 3 severe obesity due to excess calories with serious comorbidity and body mass index (BMI) of 40.0 to 44.9 in adult  Overview:  BMI 43.6      Assessment & Plan:  No appreciable weight loss in the past year   Declines obesity medicine/bariatric referral  Recommend lifestyle changes   Recommend lifestyle modifications:     ? Diet:   - Adopt a mediterranean diet, with an emphasis on vegetables, fruits (limited tropical fruits), fish, poultry, non-tropical vegetable oils, nuts; minimize red meat.  - Significantly reduce consumption of bread, pasta, rice, potatoes, sweets, and sugary drinks  - Reduce saturated fat; eliminate fried foods and trans fats; choose grilled over fried food  - Reduce ultra processed food.  If it comes in a box or wrapper it is likely processed.  - Limit your portion sizes     ? Exercise:  - Regular aerobic activity (at least 150 minutes/week of moderate-intensity activity, 75 minutes/week of vigorous intensity activity, or an equivalent combination of both)  - Resistance exercises (weights, bands, body-weight) at least two times per week    Orders:  -     Immunofixation (LIAN)& Protein Electrophoresis, Urine, 24Hr; Future; Expected date: 03/11/2025  -     Immunofixation & Protein Electrophoresis & Immunoglobulins; Future; Expected date: 03/11/2025  -     CBC Auto Differential; Future; Expected date: 03/11/2025  -     Comprehensive Metabolic Panel; Future; Expected date: 03/11/2025  -      Lipid Panel; Future; Expected date: 03/11/2025  -     TSH; Future; Expected date: 03/11/2025  -     Hemoglobin A1C; Future; Expected date: 03/11/2025  -     Urinalysis; Future; Expected date: 03/11/2025  -     Vitamin D; Future; Expected date: 03/11/2025    6. Drug-induced immunodeficiency  Overview:  Takes Humira forHidradenitis suppurativa once weekly, prescribed by Dermatology    Orders:  -     Immunofixation (LIAN)& Protein Electrophoresis, Urine, 24Hr; Future; Expected date: 03/11/2025  -     Immunofixation & Protein Electrophoresis & Immunoglobulins; Future; Expected date: 03/11/2025  -     CBC Auto Differential; Future; Expected date: 03/11/2025  -     Comprehensive Metabolic Panel; Future; Expected date: 03/11/2025  -     Lipid Panel; Future; Expected date: 03/11/2025  -     TSH; Future; Expected date: 03/11/2025  -     Hemoglobin A1C; Future; Expected date: 03/11/2025  -     Urinalysis; Future; Expected date: 03/11/2025  -     Vitamin D; Future; Expected date: 03/11/2025    7. Smoker  Overview:  30 year history 1 pack per day   Patient not prepared for smoking cessation at this time    Orders:  -     Immunofixation (LIAN)& Protein Electrophoresis, Urine, 24Hr; Future; Expected date: 03/11/2025  -     Immunofixation & Protein Electrophoresis & Immunoglobulins; Future; Expected date: 03/11/2025  -     CBC Auto Differential; Future; Expected date: 03/11/2025  -     Comprehensive Metabolic Panel; Future; Expected date: 03/11/2025  -     Lipid Panel; Future; Expected date: 03/11/2025  -     TSH; Future; Expected date: 03/11/2025  -     Hemoglobin A1C; Future; Expected date: 03/11/2025  -     Urinalysis; Future; Expected date: 03/11/2025  -     Vitamin D; Future; Expected date: 03/11/2025    8. Abnormal serum total protein level  -     Immunofixation (LIAN)& Protein Electrophoresis, Urine, 24Hr; Future; Expected date: 03/11/2025  -     Immunofixation & Protein Electrophoresis & Immunoglobulins; Future; Expected  date: 03/11/2025  -     CBC Auto Differential; Future; Expected date: 03/11/2025  -     Comprehensive Metabolic Panel; Future; Expected date: 03/11/2025  -     Lipid Panel; Future; Expected date: 03/11/2025  -     TSH; Future; Expected date: 03/11/2025  -     Hemoglobin A1C; Future; Expected date: 03/11/2025  -     Urinalysis; Future; Expected date: 03/11/2025  -     Vitamin D; Future; Expected date: 03/11/2025         PSA not allowed to be ordered in [facility] due to Medicare not covering within 1 year   Follow up 3 months for all labs  Declines all vaccines  Follow up in about 3 months (around 3/11/2025). In addition to their scheduled follow up, the patient has also been instructed to follow up on as needed basis.     No future appointments.     Conor Byrd MD

## 2024-12-11 NOTE — ASSESSMENT & PLAN NOTE
Stop Zetia on his own due to GI side-effects, we will follow up with Cardiology  LDL-C 116   Non HDLC 146  Continue following with cardiology  ApoB not back yet

## 2024-12-11 NOTE — ASSESSMENT & PLAN NOTE
No appreciable weight loss in the past year   Declines obesity medicine/bariatric referral  Recommend lifestyle changes   Recommend lifestyle modifications:     ? Diet:   - Adopt a mediterranean diet, with an emphasis on vegetables, fruits (limited tropical fruits), fish, poultry, non-tropical vegetable oils, nuts; minimize red meat.  - Significantly reduce consumption of bread, pasta, rice, potatoes, sweets, and sugary drinks  - Reduce saturated fat; eliminate fried foods and trans fats; choose grilled over fried food  - Reduce ultra processed food.  If it comes in a box or wrapper it is likely processed.  - Limit your portion sizes     ? Exercise:  - Regular aerobic activity (at least 150 minutes/week of moderate-intensity activity, 75 minutes/week of vigorous intensity activity, or an equivalent combination of both)  - Resistance exercises (weights, bands, body-weight) at least two times per week

## 2025-02-20 ENCOUNTER — TELEPHONE (OUTPATIENT)
Dept: FAMILY MEDICINE | Facility: CLINIC | Age: 50
End: 2025-02-20
Payer: MEDICARE